# Patient Record
Sex: MALE | Race: WHITE | Employment: OTHER | URBAN - METROPOLITAN AREA
[De-identification: names, ages, dates, MRNs, and addresses within clinical notes are randomized per-mention and may not be internally consistent; named-entity substitution may affect disease eponyms.]

---

## 2024-04-12 ENCOUNTER — OFFICE VISIT (OUTPATIENT)
Dept: NEUROLOGY | Facility: CLINIC | Age: 79
End: 2024-04-12
Payer: MEDICARE

## 2024-04-12 VITALS
SYSTOLIC BLOOD PRESSURE: 129 MMHG | OXYGEN SATURATION: 97 % | HEART RATE: 73 BPM | TEMPERATURE: 97.3 F | DIASTOLIC BLOOD PRESSURE: 70 MMHG

## 2024-04-12 DIAGNOSIS — G31.84 AMNESTIC MCI (MILD COGNITIVE IMPAIRMENT WITH MEMORY LOSS): Primary | ICD-10-CM

## 2024-04-12 LAB
Lab: NORMAL
PERFORMING LABORATORY: NORMAL
SPECIMEN STATUS: NORMAL
TEST NAME: NORMAL

## 2024-04-12 PROCEDURE — 81401 MOPATH PROCEDURE LEVEL 2: CPT | Mod: ORL | Performed by: SPECIALIST

## 2024-04-12 RX ORDER — SIMVASTATIN 20 MG
1 TABLET ORAL AT BEDTIME
COMMUNITY
Start: 2023-05-24 | End: 2024-08-01

## 2024-04-12 RX ORDER — TAMSULOSIN HYDROCHLORIDE 0.4 MG/1
0.4 CAPSULE ORAL EVERY EVENING
COMMUNITY
Start: 2023-06-15 | End: 2024-07-19

## 2024-04-12 RX ORDER — DONEPEZIL HYDROCHLORIDE 10 MG/1
10 TABLET, FILM COATED ORAL AT BEDTIME
COMMUNITY
Start: 2024-02-19 | End: 2024-07-19

## 2024-04-12 RX ORDER — FLUTICASONE PROPIONATE 50 MCG
2 SPRAY, SUSPENSION (ML) NASAL EVERY EVENING
COMMUNITY

## 2024-04-12 NOTE — LETTER
4/12/2024       RE: Brian English  [address]  [phone]  [facility] [address]       Dear Colleague,    Thank you for referring your patient, Brian English, to the  [facility] at [facility]. Please see a copy of my visit note below.    [facility] New Visit Note    Chief Complaint: memory problems    History of Present Illness:  Mr. Tameka Urias) is a 78 year old right-handed male presenting for evaluation of memory problems. He is accompanied to today's visit by his wife Camille who assists in providing the history. 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 However, he was encouraged by his family to move care back to [place] area due to concerns of long distance driving.     He reported short-term memory difficulties for 2 years or so and more notable in past 6 months, mostly recalling conversations occurred like 5 minutes ago, or trouble finding his car in parking lot. Denies any word finding difficulties or navigation with direction. Some executive function impairment such as issues with tax filing. In the past 3 months, his wife Camille reported increased nervousness when driving along with him, described as he becoming more dependent upon his wife to follow driving instructions. He self described as a very cautious  with no accident. Denies any visual or auditory hallucination. No changes in personality, or delusions/paranoia symptoms.    His sleep has been poor; with ~ 3 hours maximum of sleep at night then he wakes up with trouble getting back to sleep. Denies any vivid dreams or REM sleep related movements.    He had one fall 3-4 months ago when he tripped over dog leash but no reported issues with balance, or coordination with limb movements.    He was started on aricept at 10mg daily for 1.5 years with no major noticeable changes while taking it.    He  remains very active in community services and various program such as involvement in [employer] education program (usually conferences in May).    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  Managing his own medications without any issues but needs reminders for his appointments (cell phone and wife's help).    He has been seeing a neurologist Dr. Rodriguez at [facility] and last visit was in 1/29/24. Prior work up included the FDG-PET brain, MRI brain and neuropsychological testing done in 2022.    ROS:  General: no weight loss or fever  Cardiac: no chest pain or palpitations  Pulmonary: no SOB or cough  GI: no abdominal pain, nausea, vomiting, or constipation  : no urinary urgency, pain or incontinence  Musculoskeletal: no joint pain or stiffness  Skin: no rashes or easy bruising  Neurological: as above  Pain Evaluation: no reported pain today.      PMHx:  BPH  Hyperlipidemia    No stroke or CAD    Past Surgical History:   Procedure Laterality Date    CHOLECYSTECTOMY      HAND SURGERY      Left hand April 2014, Right hand May 9th for dupuytrens contractions     Current Outpatient Medications   Medication Sig Dispense Refill    Calcium Carb-Cholecalciferol 600-20 MG-MCG CHEW Take 1 tablet by mouth daily      donepezil (ARICEPT) 10 MG tablet Take 10 mg by mouth at bedtime      fluticasone (FLONASE) 50 MCG/ACT nasal spray Spray 2 sprays into both nostrils every evening      FOLIC ACID PO Take 1 mg by mouth daily      guaiFENesin (MUCINEX) 600 MG 12 hr tablet Take 1,200 mg by mouth 2 times daily      Pyridoxine HCl (VITAMIN B-6 PO) Take 100 mg by mouth daily      simvastatin (ZOCOR) 20 MG tablet Take 1 tablet by mouth at bedtime      tamsulosin (FLOMAX) 0.4 MG capsule Take 0.4 mg by mouth every evening      albuterol (ALBUTEROL) 108 (90 BASE) MCG/ACT inhaler Inhale 2 puffs into the lungs every 4 hours as needed for shortness of breath / dyspnea (Patient not taking: Reported on  4/12/2024) 1 Inhaler 0     No current facility-administered medications for this visit.      No Known Allergies    FHx:  Maternal aunt and uncle with dementia (at age of 70s); mother with CAD    Social History     Socioeconomic History    Marital status:      Spouse name: Not on file    Number of children: Not on file    Years of education: Not on file    Highest education level: Not on file   Occupational History    Not on file   Tobacco Use    Smoking status: Former    Smokeless tobacco: Not on file   Substance and Sexual Activity    Alcohol use: Yes     Alcohol/week: 5.8 standard drinks of alcohol     Types: 7 Glasses of wine per week    Drug use: Not on file    Sexual activity: Never   Other Topics Concern    Not on file   Social History Narrative    Not on file     Social Determinants of Health     Financial Resource Strain: Not on file   Food Insecurity: Not on file   Transportation Needs: Not on file   Physical Activity: Not on file   Stress: Not on file   Social Connections: Not on file   Interpersonal Safety: Not on file   Housing Stability: Not on file       General Physical examination:  There were no vitals taken for this visit.   /70 (BP Location: Right arm, Patient Position: Sitting, Cuff Size: Adult Regular)   Pulse 73   Temp 97.3  F (36.3  C) (Temporal)   SpO2 97%      General: Mr. English is well appearing and is appropriately groomed and dressed.    Neurological examination:    Mental status: Mr. English  is awake, alert, and appropriate, with fluent speech, no word finding difficulties and no difficulty in answering questions. He is well oriented to time, place and person. Attention and concentration are intact. His fund of knowledge is fair. No apraxia is noted.  Cranial nerves:  Visual fields are full to confrontation with no visual extinction. Extraocular movements are intact. Smooth pursuit is intact. Saccades are normal in initiation, velocity and amplitude both vertically and  horizontally.  Facial strength is full bilaterally.  Sternocledomastoid and trapezius muscle strength is full bilaterally.  Motor examination: Bulk is normal throughout. Axial and upper and lower extremity tone is normal. No pronator drift is noted. Strength is 5/5 and symmetric throughout. There is no tremor or other involuntary movement. No bradykinesia or cogwheel rigidity.  Sensation: LT intact b/l throughout.  Coordination: Finger-nose-finger is normal with no dysmetria bilaterally.  Rapid finger tapping, opening and closing of fist and pronation-supination are not slowed. Positive Romberg's sign.  Gait: He has an upright and steady stance with normal stride length and arm swing. Tandem walking is slightly unsteady. Negative Retropulsion.    MoCA 26/30 (16+ years of education)  5/5 visuospatial/Executive  3/3 naming  5/6 attention (-1 for backward digit span)  3/3 language  2/2 abstract  2/5 delayed recall (+2 for semantic cues; +1 for MC; MIS 11/15)  6/6 orientation      Neuropsychological evaluation:  N/A      Labs:  7/22/22  Ref Range & Units 1 yr ago   TSH, Sensitive  0.3 - 4.2 mIU/L 2.1     Ref Range & Units 1 yr ago   Vitamin B12 Assay, S  180 - 914 ng/L 224     Ref Range & Units 1 yr ago   Folate, S  >=4.0 mcg/L >20.0     Imaging:  Results for orders placed or performed during the hospital encounter of 08/24/14   Chest XR,  PA & LAT    Narrative    XR CHEST 2 VW   8/24/2014 11:06 AM     HISTORY: Shortness of breath.    COMPARISON: None.      Impression    IMPRESSION: No acute cardiopulmonary disease.    ANOOP COULTER MD   Neck soft tissue XR    Narrative    NECK SOFT TISSUE   8/24/2014 11:06 AM     HISTORY: Evaluate epiglottitis.    COMPARISON: None.      Impression    IMPRESSION: Epiglottis appears normal on lateral view of the neck.  Airway is widely patent. No fractures identified.    ANOOP COULTER MD      MRI brain 10/11/2022  MR Brain Dementia without IV Contrast  Order: [id number]  Impression    1.   Unchanged generalized parenchymal volume loss without lobar predilection. Quantitative analysis suggests slight interval hippocampal volume loss, with decrease in the age-adjusted normative percentile from 19 to 8.  2.  No acute intracranial abnormality.    PET CT brain 10/12/22  FINDINGS:      Screen captures of relevant findings are provided in QREADS. These are typically filed as the last images in the electronic jacket for the examination.     Cortex-ID analysis indicates widespread patchy diminished hypometabolism with relative sparing of the temporal lobes, anterior aspects of the frontal lobes, and primary visual cortices. This appears to be predominantly due to misregistration artifact due to cerebral atrophy. There is no distinct pattern to suggest Alzheimer, Lewy body, or frontotemporal disease.     FDG uptake by the right caudate head is diminished compared to the left caudate head     Impression:  Mr. English is a 78 year old right-handed male who presents with memory problems for 2 years, mostly short-term memory recalls. MOCA today 26/30 with 2/5 delayed recall (MIS 11/15). The rest of neuro-exam unremarkable except +Romberg and some unsteadiness with tandem. No cogwheel rigidity or bradykinesia noted. 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 Etiologies could be Alzheimer's disease possible.     Recommendations:  1. OK to proceed with neuropsychological test or PET-amyloid studies at [place].  2. Continue aricept at 10mg daily.  3. Discuss in length about the options of anti-amyloid antibody therapies (eligibility, benefits, risks and treatment plans). Will  mail some information pamphlet to him. Will need to have amyloid PET or CSF biomarker studies to demonstrate his eligibility for this line of treatment.  4. ApoE genotype can be obtained to determine his risks/benefits with anti-amyloid antibody therapies if indicated.    Follow-up: Return in about 3  months (request to have a video visit along with his daughters for a family call.    I spent >90 minutes total today for this visit, which includes face-to-face with the patient, reviewing the chart (neuroimages, lab reports, clinical notes, medications), ordering diagnostic test, counseling, and documentation.         Again, thank you for allowing me to participate in the care of your patient.      Sincerely,    Jud Montoya MD

## 2024-04-18 LAB — MISCELLANEOUS TEST 1 (ARUP): ABNORMAL

## 2024-04-23 DIAGNOSIS — G31.84 AMNESTIC MCI (MILD COGNITIVE IMPAIRMENT WITH MEMORY LOSS): Primary | ICD-10-CM

## 2024-05-22 ENCOUNTER — ANCILLARY PROCEDURE (OUTPATIENT)
Dept: PET IMAGING | Facility: CLINIC | Age: 79
End: 2024-05-22
Attending: SPECIALIST
Payer: MEDICARE

## 2024-05-22 DIAGNOSIS — G31.84 AMNESTIC MCI (MILD COGNITIVE IMPAIRMENT WITH MEMORY LOSS): ICD-10-CM

## 2024-06-06 NOTE — PROGRESS NOTES
Neurology Memory Clinic New Visit Note    Chief Complaint: memory problems    History of Present Illness:  Mr. English is a 78 year old right-handed male presenting for evaluation of memory problems for 2 years, mostly short-term memory recalls. He was last seen in clinic in 4/12/2024 when his MOCA  was 26/30 with 2/5 delayed recall (MIS 11/15). The rest of neuro-exam unremarkable except +Romberg and some unsteadiness with tandem. No cogwheel rigidity or bradykinesia noted. He is here today for follow up and accompanied by his wife Camille and one daughter who assisted in providing the history.    Mr. English and the family reported that some decline as described as more noticeable forgetfulness, trouble with navigating familiar places, or needs to be reminded for upcoming appointments. Patient also noted that he needs to be mindful about walking in less familiar areas. Some struggles with phone texts and increased frustration with computer usage.    Denies any visual or auditory hallucination.    Sleep remains interrupted with trouble with getting into sleep. However, he feels less anxious about not getting into sleep. Currently he gets average 3-4 hours per night.    ROS:  General: no weight loss or fever  Cardiac: no chest pain or palpitations  Pulmonary: no SOB or cough  GI: no abdominal pain, nausea, vomiting, or constipation  : no urinary urgency, pain or incontinence  Musculoskeletal: no joint pain or stiffness  Skin: no rashes or easy bruising  Neurological: as above  Pain Evaluation: No reported pain today.    PMHx:  BPH  Hyperlipidemia     No stroke or CAD      Past Surgical History:   Procedure Laterality Date    CHOLECYSTECTOMY      HAND SURGERY      Left hand April 2014, Right hand May 9th for dupuytrens contractions       Current Outpatient Medications   Medication Sig Dispense Refill    Calcium Carb-Cholecalciferol 600-20 MG-MCG CHEW Take 1 tablet by mouth daily      donepezil (ARICEPT) 10 MG tablet  Take 10 mg by mouth at bedtime      fluticasone (FLONASE) 50 MCG/ACT nasal spray Spray 2 sprays into both nostrils every evening      FOLIC ACID PO Take 1 mg by mouth daily      Pyridoxine HCl (VITAMIN B-6 PO) Take 100 mg by mouth daily      simvastatin (ZOCOR) 20 MG tablet Take 1 tablet by mouth at bedtime      tamsulosin (FLOMAX) 0.4 MG capsule Take 0.4 mg by mouth every evening          Allergies   Allergen Reactions    Bee Venom Swelling     Swelling of extremity - No anaphylaxis    Dust Mites Other (See Comments)    Iodinated Contrast Media Other (See Comments)     Xray or Contrast Dye Iodine - Unknown       FHx:  Maternal aunt and uncle with dementia (at age of 70s); mother with CAD    Lab data:  4/12/2024                       APOE Alzheimer Disease Risk, Genotype                                 e3/e4 A                         Indication for testing: Determine APOE genotype for the purpose of Alzheimer disease risk assessment.     Heterozygous APOE e3/e4: One copy of the APOE e4 allele was detected, which is associated with an increased risk for   Alzheimer disease (AD). Although this genotype supports a clinical diagnosis of AD in symptomatic individuals, the   diagnosis is primarily based on clinical evaluation and APOE genotype alone is not sufficient to diagnose or   exclude AD.     Imaging:  Results for orders placed or performed in visit on 05/22/24   PET Brain Amyloid    Narrative    ;hgEXAM: PET BRAIN AMYLOID  5/22/2024 1:52 PM     Amyvid (Florbetapir F18) PET of the brain    HISTORY: Amnestic MCI (mild cognitive impairment with memory loss)       COMPARISON: None    TECHNIQUE: Dose:  11.23 mCi, 50 minutes uptake, Matrix size: 256, Type  of acquisition: 3D, Reconstruction method: Iterative reconstruction  using TOF, Attenuation correction: CT.  Regional cortical tracer uptake (RCTU) was measured for 4 parts of the  brain. From these RCTU scores, brain amyloid plaque load (BAPL) score  was  calculated.    Findings: RCTU:   Temporal lobes: 3  Frontal lobes: 3  Precuneus and cingulate region: 3  Parietal lobes: 3    BAPL score: 3    CT: Mild cerebellar volume loss. Mild-to-moderate diffuse cerebral  parenchymal volume loss. Hypoattenuation in the right anterior basal  ganglia and left anterior limb of the internal capsular region which  could be seen with chronic ischemic changes. No extra-axial  collection. Inflammatory mucosal thickening of ethmoid air cells,  maxillary sinuses. Skull base and calvarium are intact.      Impression    Impression:   Positive scan with pronounced amyloid deposition.    Incidentally identified inflammatory sinus mucosal of the maxillary  sinuses and ethmoid air cells.    -------------------  Amyvid is a radioactive diagnostic agent indicated for Positron  Emission Tomography (PET) imaging of the brain to estimate ?-amyloid  neuritic plaque density in adult patients with cognitive impairment  who are being evaluated for Alzheimer?s Disease (AD) and other causes  of cognitive decline. A negative Amyvid scan indicates sparse to no  neuritic plaques and is inconsistent with a neuropathological  diagnosis of AD at the time of image acquisition; a negative scan  result reduces the likelihood that a patient?s cognitive impairment is  due to AD. A positive Amyvid scan indicates moderate to frequent  amyloid neuritic plaques; neuropathological examination has shown this  amount of amyloid neuritic plaque is present in patients with AD, but  may also be present in patients with other types of neurologic  conditions as well as older people with normal cognition. Amyvid is an  adjunct to other diagnostic evaluations.    RCTU:  1: No tracer uptake by the gray matter that is greater than the white  matter  2. Moderate tracer uptake. Smaller areas of GM uptake equal to or  greater than WM  3. Pronounced tracer uptake. A large confluent area of GM uptake equal  to or greater than WM  uptake    BAPL:   Negative scan  1. Scan without amyloid deposition (RCTU '1' in each 4 regions)  Positive scan  2. Scan with moderate amyloid deposition (RCTU '2' in any or all  regions, no RCTU of '3')  3. Scan with pronounced amyloid deposition (RCTU '3' in at least 1  region).    ANTONIO BROWN MD         SYSTEM ID:  [id number]      Impression:  Mr. English is a 78 year old right-handed male who presents with memory problems mostly short-term memory issues for 2+ years. Since last visit, some progression per family report with increased forgetfulness and frustration with tasks. PET scan showed pronounced amyloid deposit and ApoE genotype testing showed E3/4 genotype. 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 A lengthy discussion was carried out with patient and family members about his eligibility for anti-amyloid antibody therapies, potential benefits and risks and upcoming infusion versus subcutaneous options in the horizon and pending [place] implementation of IV infusion pipeline for this line of therapies. Encourage to explore options at [place] if their anti-amyloid antibody therapy can be delivered sooner. Will keep them posted on any progression from [employer] [facility] side. Before starting treatment, will need to have a baseline MRI brain study to rule out microhemorrhages. Also discussed about selective serotonin reuptake inhibitors for mood symptoms. Patient would like to pursue behavior/psychotherapy before considering drug treatments.    Follow-up: Return in about 3-6 months depending on lecanemab treatment availability.    I spent >90 minutes total today for this visit, which includes face-to-face with the patient, reviewing the chart (neuro images, lab reports, clinical notes, medications), counseling, and documentation.

## 2024-06-07 ENCOUNTER — OFFICE VISIT (OUTPATIENT)
Dept: NEUROLOGY | Facility: CLINIC | Age: 79
End: 2024-06-07
Payer: MEDICARE

## 2024-06-07 VITALS
HEART RATE: 50 BPM | HEIGHT: 74 IN | TEMPERATURE: 98.1 F | WEIGHT: 182.2 LBS | DIASTOLIC BLOOD PRESSURE: 67 MMHG | SYSTOLIC BLOOD PRESSURE: 105 MMHG | BODY MASS INDEX: 23.38 KG/M2 | OXYGEN SATURATION: 100 %

## 2024-06-07 DIAGNOSIS — G31.84 AMNESTIC MCI (MILD COGNITIVE IMPAIRMENT WITH MEMORY LOSS): Primary | ICD-10-CM

## 2024-06-07 NOTE — LETTER
6/7/2024       RE: Brian English  [address]  [phone]  [facility] [address]     Dear Colleague,    Thank you for referring your patient, Brian English, to the  [facility] at [facility]. Please see a copy of my visit note below.    Neurology Memory Clinic New Visit Note    Chief Complaint: memory problems    History of Present Illness:  Mr. English is a 78 year old right-handed male presenting for evaluation of memory problems for 2 years, mostly short-term memory recalls. He was last seen in clinic in 4/12/2024 when his MOCA  was 26/30 with 2/5 delayed recall (MIS 11/15). The rest of neuro-exam unremarkable except +Romberg and some unsteadiness with tandem. No cogwheel rigidity or bradykinesia noted. He is here today for follow up and accompanied by his wife Camille and one daughter who assisted in providing the history.    Mr. English and the family reported that some decline as described as more noticeable forgetfulness, trouble with navigating familiar places, or needs to be reminded for upcoming appointments. Patient also noted that he needs to be mindful about walking in less familiar areas. Some struggles with phone texts and increased frustration with computer usage.    Denies any visual or auditory hallucination.    Sleep remains interrupted with trouble with getting into sleep. However, he feels less anxious about not getting into sleep. Currently he gets average 3-4 hours per night.    ROS:  General: no weight loss or fever  Cardiac: no chest pain or palpitations  Pulmonary: no SOB or cough  GI: no abdominal pain, nausea, vomiting, or constipation  : no urinary urgency, pain or incontinence  Musculoskeletal: no joint pain or stiffness  Skin: no rashes or easy bruising  Neurological: as above  Pain Evaluation: No reported pain today.    PMHx:  BPH  Hyperlipidemia     No stroke or CAD      Past Surgical History:    Procedure Laterality Date    CHOLECYSTECTOMY      HAND SURGERY      Left hand April 2014, Right hand May 9th for dupuytrens contractions       Current Outpatient Medications   Medication Sig Dispense Refill    Calcium Carb-Cholecalciferol 600-20 MG-MCG CHEW Take 1 tablet by mouth daily      donepezil (ARICEPT) 10 MG tablet Take 10 mg by mouth at bedtime      fluticasone (FLONASE) 50 MCG/ACT nasal spray Spray 2 sprays into both nostrils every evening      FOLIC ACID PO Take 1 mg by mouth daily      Pyridoxine HCl (VITAMIN B-6 PO) Take 100 mg by mouth daily      simvastatin (ZOCOR) 20 MG tablet Take 1 tablet by mouth at bedtime      tamsulosin (FLOMAX) 0.4 MG capsule Take 0.4 mg by mouth every evening          Allergies   Allergen Reactions    Bee Venom Swelling     Swelling of extremity - No anaphylaxis    Dust Mites Other (See Comments)    Iodinated Contrast Media Other (See Comments)     Xray or Contrast Dye Iodine - Unknown       FHx:  Maternal aunt and uncle with dementia (at age of 70s); mother with CAD    Lab data:  4/12/2024                       APOE Alzheimer Disease Risk, Genotype                                 e3/e4 A                         Indication for testing: Determine APOE genotype for the purpose of Alzheimer disease risk assessment.     Heterozygous APOE e3/e4: One copy of the APOE e4 allele was detected, which is associated with an increased risk for   Alzheimer disease (AD). Although this genotype supports a clinical diagnosis of AD in symptomatic individuals, the   diagnosis is primarily based on clinical evaluation and APOE genotype alone is not sufficient to diagnose or   exclude AD.     Imaging:  Results for orders placed or performed in visit on 05/22/24   PET Brain Amyloid    Narrative    ;hgEXAM: PET BRAIN AMYLOID  5/22/2024 1:52 PM     Amyvid (Florbetapir F18) PET of the brain    HISTORY: Amnestic MCI (mild cognitive impairment with memory loss)       COMPARISON: None    TECHNIQUE:  Dose:  11.23 mCi, 50 minutes uptake, Matrix size: 256, Type  of acquisition: 3D, Reconstruction method: Iterative reconstruction  using TOF, Attenuation correction: CT.  Regional cortical tracer uptake (RCTU) was measured for 4 parts of the  brain. From these RCTU scores, brain amyloid plaque load (BAPL) score  was calculated.    Findings: RCTU:   Temporal lobes: 3  Frontal lobes: 3  Precuneus and cingulate region: 3  Parietal lobes: 3    BAPL score: 3    CT: Mild cerebellar volume loss. Mild-to-moderate diffuse cerebral  parenchymal volume loss. Hypoattenuation in the right anterior basal  ganglia and left anterior limb of the internal capsular region which  could be seen with chronic ischemic changes. No extra-axial  collection. Inflammatory mucosal thickening of ethmoid air cells,  maxillary sinuses. Skull base and calvarium are intact.      Impression    Impression:   Positive scan with pronounced amyloid deposition.    Incidentally identified inflammatory sinus mucosal of the maxillary  sinuses and ethmoid air cells.    -------------------  Amyvid is a radioactive diagnostic agent indicated for Positron  Emission Tomography (PET) imaging of the brain to estimate ?-amyloid  neuritic plaque density in adult patients with cognitive impairment  who are being evaluated for Alzheimer?s Disease (AD) and other causes  of cognitive decline. A negative Amyvid scan indicates sparse to no  neuritic plaques and is inconsistent with a neuropathological  diagnosis of AD at the time of image acquisition; a negative scan  result reduces the likelihood that a patient?s cognitive impairment is  due to AD. A positive Amyvid scan indicates moderate to frequent  amyloid neuritic plaques; neuropathological examination has shown this  amount of amyloid neuritic plaque is present in patients with AD, but  may also be present in patients with other types of neurologic  conditions as well as older people with normal cognition. Amyvid is  an  adjunct to other diagnostic evaluations.    RCTU:  1: No tracer uptake by the gray matter that is greater than the white  matter  2. Moderate tracer uptake. Smaller areas of GM uptake equal to or  greater than WM  3. Pronounced tracer uptake. A large confluent area of GM uptake equal  to or greater than WM uptake    BAPL:   Negative scan  1. Scan without amyloid deposition (RCTU '1' in each 4 regions)  Positive scan  2. Scan with moderate amyloid deposition (RCTU '2' in any or all  regions, no RCTU of '3')  3. Scan with pronounced amyloid deposition (RCTU '3' in at least 1  region).    ANTONIO BROWN MD         SYSTEM ID:  [id number]      Impression:  Mr. English is a 78 year old right-handed male who presents with memory problems mostly short-term memory issues for 2+ years. Since last visit, some progression per family report with increased forgetfulness and frustration with tasks. PET scan showed pronounced amyloid deposit and ApoE genotype testing showed E3/4 genotype. 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 A lengthy discussion was carried out with patient and family members about his eligibility for anti-amyloid antibody therapies, potential benefits and risks and upcoming infusion versus subcutaneous options in the horizon and pending [place] implementation of IV infusion pipeline for this line of therapies. Encourage to explore options at [facility] if their anti-amyloid antibody therapy can be delivered sooner. Will keep them posted on any progression from [employer] [place] side. Before starting treatment, will need to have a baseline MRI brain study to rule out microhemorrhages. Also discussed about selective serotonin reuptake inhibitors for mood symptoms. Patient would like to pursue behavior/psychotherapy before considering drug treatments.    Follow-up: Return in about 3-6 months depending on [facility]em[facility]  treatment availability.    I spent >90 minutes total today for this visit, which includes face-to-face with the patient, reviewing the chart (neuro images, lab reports, clinical notes, medications), counseling, and documentation.         Again, thank you for allowing me to participate in the care of your patient.      Sincerely,    Jud Montoya MD

## 2024-06-15 ENCOUNTER — HEALTH MAINTENANCE LETTER (OUTPATIENT)
Age: 79
End: 2024-06-15

## 2024-06-26 RX ORDER — TAMSULOSIN HYDROCHLORIDE 0.4 MG/1
0.4 CAPSULE ORAL EVERY EVENING
OUTPATIENT
Start: 2024-06-26

## 2024-06-26 RX ORDER — DONEPEZIL HYDROCHLORIDE 10 MG/1
10 TABLET, FILM COATED ORAL AT BEDTIME
OUTPATIENT
Start: 2024-06-26

## 2024-07-19 DIAGNOSIS — Z76.0 ENCOUNTER FOR MEDICATION REFILL: Primary | ICD-10-CM

## 2024-07-19 RX ORDER — TAMSULOSIN HYDROCHLORIDE 0.4 MG/1
0.4 CAPSULE ORAL EVERY EVENING
Qty: 30 CAPSULE | Refills: 0 | Status: SHIPPED | OUTPATIENT
Start: 2024-07-19 | End: 2024-08-01

## 2024-07-19 RX ORDER — DONEPEZIL HYDROCHLORIDE 10 MG/1
10 TABLET, FILM COATED ORAL AT BEDTIME
Qty: 30 TABLET | Refills: 0 | Status: SHIPPED | OUTPATIENT
Start: 2024-07-19 | End: 2024-08-01 | Stop reason: DRUGHIGH

## 2024-08-01 ENCOUNTER — OFFICE VISIT (OUTPATIENT)
Dept: FAMILY MEDICINE | Facility: CLINIC | Age: 79
End: 2024-08-01
Payer: COMMERCIAL

## 2024-08-01 VITALS
TEMPERATURE: 97.8 F | DIASTOLIC BLOOD PRESSURE: 68 MMHG | HEART RATE: 70 BPM | SYSTOLIC BLOOD PRESSURE: 106 MMHG | OXYGEN SATURATION: 97 % | BODY MASS INDEX: 24.25 KG/M2 | WEIGHT: 183 LBS | RESPIRATION RATE: 16 BRPM | HEIGHT: 73 IN

## 2024-08-01 DIAGNOSIS — N40.0 BENIGN PROSTATIC HYPERPLASIA WITHOUT LOWER URINARY TRACT SYMPTOMS: ICD-10-CM

## 2024-08-01 DIAGNOSIS — F02.80 ALZHEIMER'S DISEASE (H): Primary | ICD-10-CM

## 2024-08-01 DIAGNOSIS — E78.5 HYPERLIPIDEMIA LDL GOAL <100: ICD-10-CM

## 2024-08-01 DIAGNOSIS — M20.42 HAMMER TOES OF BOTH FEET: ICD-10-CM

## 2024-08-01 DIAGNOSIS — F51.01 PRIMARY INSOMNIA: ICD-10-CM

## 2024-08-01 DIAGNOSIS — J30.2 SEASONAL ALLERGIC RHINITIS, UNSPECIFIED TRIGGER: ICD-10-CM

## 2024-08-01 DIAGNOSIS — H10.13 ALLERGIC CONJUNCTIVITIS OF BOTH EYES: ICD-10-CM

## 2024-08-01 DIAGNOSIS — G30.9 ALZHEIMER'S DISEASE (H): Primary | ICD-10-CM

## 2024-08-01 DIAGNOSIS — M20.41 HAMMER TOES OF BOTH FEET: ICD-10-CM

## 2024-08-01 DIAGNOSIS — L60.8 DEFORMITY OF TOENAIL: ICD-10-CM

## 2024-08-01 PROCEDURE — 99204 OFFICE O/P NEW MOD 45 MIN: CPT | Performed by: INTERNAL MEDICINE

## 2024-08-01 RX ORDER — SIMVASTATIN 20 MG
20 TABLET ORAL AT BEDTIME
Qty: 90 TABLET | Refills: 3 | Status: SHIPPED | OUTPATIENT
Start: 2024-08-01

## 2024-08-01 RX ORDER — TAMSULOSIN HYDROCHLORIDE 0.4 MG/1
0.4 CAPSULE ORAL EVERY EVENING
Qty: 90 CAPSULE | Refills: 3 | Status: SHIPPED | OUTPATIENT
Start: 2024-08-01

## 2024-08-01 RX ORDER — DONEPEZIL HYDROCHLORIDE 23 MG/1
23 TABLET, FILM COATED ORAL AT BEDTIME
COMMUNITY
Start: 2024-07-19 | End: 2024-09-05 | Stop reason: ALTCHOICE

## 2024-08-01 RX ORDER — OLOPATADINE HYDROCHLORIDE 1 MG/ML
1 SOLUTION/ DROPS OPHTHALMIC 2 TIMES DAILY
Qty: 5 ML | Refills: 3 | Status: SHIPPED | OUTPATIENT
Start: 2024-08-01

## 2024-08-01 NOTE — PROGRESS NOTES
Efren Torres is a 78 year old, presenting for the following health issues:  Establish Care    History of Present Illness       Reason for visit:  New patient    He eats 2-3 servings of fruits and vegetables daily.He exercises with enough effort to increase his heart rate 10 to 19 minutes per day.  He exercises with enough effort to increase his heart rate 5 days per week. He is missing 1 dose(s) of medications per week.         Current Medications:     Current Outpatient Medications   Medication Sig Dispense Refill    Calcium Carb-Cholecalciferol 600-20 MG-MCG CHEW Take 1 tablet by mouth daily      donepezil (ARICEPT) 23 MG tablet Take 23 mg by mouth at bedtime      fluticasone (FLONASE) 50 MCG/ACT nasal spray Spray 2 sprays into both nostrils every evening      FOLIC ACID PO Take 1 mg by mouth daily      Pyridoxine HCl (VITAMIN B-6 PO) Take 100 mg by mouth daily      simvastatin (ZOCOR) 20 MG tablet Take 1 tablet by mouth at bedtime      tamsulosin (FLOMAX) 0.4 MG capsule Take 1 capsule (0.4 mg) by mouth every evening Take 0.4 mg by mouth every evening 30 capsule 0         Allergies:      Allergies   Allergen Reactions    Bee Venom Swelling     Swelling of extremity - No anaphylaxis    Dust Mites Other (See Comments)    Iodinated Contrast Media Other (See Comments)     Xray or Contrast Dye Iodine - Unknown            Past Medical History:   No past medical history on file.      Past Surgical History:     Past Surgical History:   Procedure Laterality Date    CHOLECYSTECTOMY      HAND SURGERY      Left hand April 2014, Right hand May 9th for dupuytrens contractions         Family Medical History:   No family history on file.      Social History:     Social History     Socioeconomic History    Marital status:      Spouse name: Not on file    Number of children: Not on file    Years of education: Not on file    Highest education level: Not on file   Occupational History    Not on file   Tobacco Use     Smoking status: Former    Smokeless tobacco: Not on file   Substance and Sexual Activity    Alcohol use: Yes     Alcohol/week: 5.8 standard drinks of alcohol     Types: 7 Glasses of wine per week    Drug use: Not on file    Sexual activity: Never   Other Topics Concern    Not on file   Social History Narrative    Not on file     Social Determinants of Health     Financial Resource Strain: Low Risk  (8/1/2024)    Financial Resource Strain     Within the past 12 months, have you or your family members you live with been unable to get utilities (heat, electricity) when it was really needed?: No   Food Insecurity: Low Risk  (8/1/2024)    Food Insecurity     Within the past 12 months, did you worry that your food would run out before you got money to buy more?: No     Within the past 12 months, did the food you bought just not last and you didn t have money to get more?: No   Transportation Needs: Low Risk  (8/1/2024)    Transportation Needs     Within the past 12 months, has lack of transportation kept you from medical appointments, getting your medicines, non-medical meetings or appointments, work, or from getting things that you need?: No   Physical Activity: Insufficiently Active (1/19/2024)    Received from [facility]    Exercise Vital Sign     Days of Exercise per Week: 2 days     Minutes of Exercise per Session: 40 min   Stress: Stress Concern Present (5/27/2022)    Received from [facility]    [employer] - Occupational Stress Questionnaire     Feeling of Stress : To some extent   Social Connections: Socially Integrated (5/27/2022)    Received from [facility]    Social Connection and Isolation Panel [NHANES]     Frequency of Communication with Friends and Family: More than three times a week     Frequency of Social Gatherings with Friends and Family: Twice a week     Attends [employer] Services: More than 4 times per year     Active Member of Clubs or Organizations: Yes     Attends  "Club or Organization Meetings: More than 4 times per year     Marital Status:    Interpersonal Safety: Not At Risk (1/19/2024)    Received from [facility]    Humiliation, Afraid, Rape, and Kick questionnaire     Fear of Current or Ex-Partner: No     Emotionally Abused: No     Physically Abused: No     Sexually Abused: No   Housing Stability: High Risk (8/1/2024)    Housing Stability     Do you have housing? : No     Are you worried about losing your housing?: No           Review of System:     Constitutional: Negative for fever or chills  Skin: Negative for rashes  Ears/Nose/Throat: Negative for nasal congestion, sore throat  Respiratory: No shortness of breath, dyspnea on exertion, cough, or hemoptysis  Cardiovascular: Negative for chest pain  Gastrointestinal: Negative for nausea, vomiting  Genitourinary: Negative for dysuria, hematuria  Musculoskeletal: Negative for myalgias  Neurologic: Negative for headaches  Psychiatric: Negative for depression, anxiety  Hematologic/Lymphatic/Immunologic: Negative  Endocrine: Negative  Behavioral: Negative for tobacco use       Physical Exam:   /68 (BP Location: Right arm, Patient Position: Sitting, Cuff Size: Adult Regular)   Pulse 70   Temp 97.8  F (36.6  C)   Resp 16   Ht 1.852 m (6' 0.91\")   Wt 83 kg (183 lb)   SpO2 97%   BMI 24.20 kg/m      GENERAL: alert and no distress  EYES: eyes grossly normal to inspection, and conjunctivae and sclerae normal  HENT: Normocephalic atraumatic. Nose and mouth without ulcers or lesions  NECK: supple  RESP: lungs clear to auscultation   CV: regular rate and rhythm, normal S1 S2  LYMPH: no peripheral edema   ABDOMEN: nondistended  MS: no gross musculoskeletal defects noted  SKIN: no suspicious lesions or rashes  NEURO: mild memory loss symptoms noted  PSYCH: mentation appears normal, affect normal        Diagnostic Test Results:     Diagnostic Test Results:  Labs reviewed in Epic    ASSESSMENT/PLAN:       Brian was " seen today for establish care.    Diagnoses and all orders for this visit:    Alzheimer's disease (H)  -     Memory Clinic Referral; Future    Hyperlipidemia LDL goal <100  -     simvastatin (ZOCOR) 20 MG tablet; Take 1 tablet (20 mg) by mouth at bedtime Take 1 tablet by mouth at bedtime    Seasonal allergic rhinitis, unspecified trigger  -     Adult Allergy/Asthma  Referral; Future    Benign prostatic hyperplasia without lower urinary tract symptoms  - stable, medication refilled today for    tamsulosin (FLOMAX) 0.4 MG capsule; Take 1 capsule (0.4 mg) by mouth every evening Take 0.4 mg by mouth every evening    Allergic conjunctivitis of both eyes  -     olopatadine (PATANOL) 0.1 % ophthalmic solution; Place 1 drop into both eyes 2 times daily  -     Adult Allergy/Asthma  Referral; Future    Hammer toes of both feet  -     Orthopedic  Referral; Future    Deformity of toenail  -     Orthopedic  Referral; Future    Primary insomnia  -     Adult Sleep Eval & Management  Referral; Future            Follow Up Plan:     Patient is instructed to return to Internal Medicine clinic for follow-up visit in 1 month.        Rachel Arora MD  Internal Medicine  [facility]    Signed Electronically by: Rachel Arora MD

## 2024-08-21 ENCOUNTER — TELEPHONE (OUTPATIENT)
Dept: GERIATRICS | Facility: CLINIC | Age: 79
End: 2024-08-21

## 2024-08-21 NOTE — TELEPHONE ENCOUNTER
Dr. Lima,     Patient has an upcoming appt with you on 09/05/24. Form was mail back to us and complete.     Just want to inform you that I have that questionnaire at my desk.     Thank you.

## 2024-09-05 ENCOUNTER — OFFICE VISIT (OUTPATIENT)
Dept: FAMILY MEDICINE | Facility: CLINIC | Age: 79
End: 2024-09-05
Payer: COMMERCIAL

## 2024-09-05 VITALS
HEIGHT: 73 IN | DIASTOLIC BLOOD PRESSURE: 68 MMHG | RESPIRATION RATE: 16 BRPM | WEIGHT: 183 LBS | HEART RATE: 79 BPM | SYSTOLIC BLOOD PRESSURE: 103 MMHG | BODY MASS INDEX: 24.25 KG/M2 | OXYGEN SATURATION: 95 %

## 2024-09-05 DIAGNOSIS — G30.1 MILD LATE ONSET ALZHEIMER'S DEMENTIA WITHOUT BEHAVIORAL DISTURBANCE, PSYCHOTIC DISTURBANCE, MOOD DISTURBANCE, OR ANXIETY (H): Primary | ICD-10-CM

## 2024-09-05 DIAGNOSIS — F02.A0 MILD LATE ONSET ALZHEIMER'S DEMENTIA WITHOUT BEHAVIORAL DISTURBANCE, PSYCHOTIC DISTURBANCE, MOOD DISTURBANCE, OR ANXIETY (H): Primary | ICD-10-CM

## 2024-09-05 PROCEDURE — 99417 PROLNG OP E/M EACH 15 MIN: CPT | Performed by: INTERNAL MEDICINE

## 2024-09-05 PROCEDURE — G2211 COMPLEX E/M VISIT ADD ON: HCPCS | Performed by: INTERNAL MEDICINE

## 2024-09-05 PROCEDURE — 99215 OFFICE O/P EST HI 40 MIN: CPT | Performed by: INTERNAL MEDICINE

## 2024-09-05 RX ORDER — DONEPEZIL HYDROCHLORIDE 10 MG/1
10 TABLET, FILM COATED ORAL EVERY MORNING
COMMUNITY

## 2024-09-05 ASSESSMENT — PAIN SCALES - GENERAL: PAINLEVEL: NO PAIN (0)

## 2024-09-05 NOTE — Clinical Note
FYI they plan to schedule follow up with you as had great repoire though no longer planning anti-amyloid therapies as in the interim [facility] noted microhemorrhages on MRI (I've requested records). Thanks!

## 2024-09-05 NOTE — PROGRESS NOTES
"MEMORY EVALUATION CLINIC - INITIAL EVALUATION    HPI: Mr. Brian English is an 78yoM here today for initial consultation in our clinic as referred by PCP. He is accompanied by his wife Camille and one of his daughters Renetta. He feels \"pretty good\" overall but has insight into cognitive changes and earlier this year formal diagnosis of early stages of Alzheimer's disease. They note gradually progressive cognitive changes for about 2 years in areas of : short term and long term memory loss, word/name finding difficulty and visual processing. 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 More recently decided to consolidate care in [facility] closer to home. Saw [facility] neurologist Dr. Jud Montoya and [facility] 26/30 with +PET scan for amyloid deposition and heterozygous APOe. Was awaiting potential anti-amyloid therapies within the system, planning future repeat MRI and met in the meantime with [facility] Dr. Washington. We are requesting those records but apparently MRI of the brain in their clinic identified microhemorrhages so he was told he was not a candidate for anti-amyloid therapies and they are accepting of this. They did have a good repoire though with Dr. Montoya and would be interested in continuing to follow along with her and her neurology expertise. Would like me to be a part of his care too for now.    Of note, Dr. Washington had him increase Donepezil from 10 mg to 23 mg but that dose was not tolerated at all; felt dizzy and nauseated on the 23 mg dose which really wiped him out and improved when back down to 10 mg dose. Some mild loose stools possibly from Donepezil though but tolerable and wishes to stay on 10 mg dose. They ask questions about potential other therapies such as Namenda.    Overall, family feels he has been taking this all in stride pretty well and they are thankful for this and compliment him. Camille shares " "a loss that she has noticed the past 6 months in that he doesn't have the sense of humor he once did nor able to have as indepth quality conversations. She knows he can't help it but just she notices this and shares this today. She has a wonderful support network in her life as a resource. He's not feeling the need to add an anti-depressant at this time but they've considered this occasionally and Dr. Montoya had mentioned possible Lexapro which I'd agree with too if desired down-the-line. He does enjoy reading. Some insomnia at times the past several years with his cognitive changes; will switch Donepezil from PM to AM to see if this helps.     Lastly, he misses driving. Retired at family's suggestion last month after PCP mentioned potential concern given his diagnosis and family too also starting to notice changes. A year ago Renetta rode with him on the highway and he was slower than surrounding traffic. Also has been able to track him on \"360\" and sees he is making lots of U turns and seemingly getting lost at times which was not like how he used to drive. She has not felt comfortable riding along with him since. Camille noted when she rode along she used to have to really direct him where he was going which became more concerning for her in time. They are considering a behind-the-wheel assessment if that will help him have an independent review.      Social Hx (Employment/Schooling): Originally from [place]. Graduated from [employer]. Worked as a  for a few years and then [employer] medical administration for 32 years. In second marriage. Has 2 adult daughters ([place] and [place] Enjoys reading, card games, walking, courses/seminars.  PMH: Reviewed. Most notable for: Hyperlipidemia  Is there a h/o delirium, CVA/TIA, TBI, substances, parkinsonism?: Microhemorrhages per report in recent MRI per [facility]  Family Hx: Yes maternal grandmother    ADLs: Independent  Driving: Concerns as " "noted above by family ; now retired from driving the past month  Finances: Camille has now been managing taxes/bills  Shopping/housekeeping/meal prep: Mostly per Camille  Medications: Camille starting to help the past few months  Home situation: Independent apartment with Camille; though on wait list for shelter  Support: Family  Behaviors/Hallucinations/Delusions: None reported      OBJECTIVE:  /68 (BP Location: Left arm, Patient Position: Sitting, Cuff Size: Adult Regular)   Pulse 79   Resp 16   Ht 1.852 m (6' 0.91\")   Wt 83 kg (183 lb)   SpO2 95%   BMI 24.20 kg/m    Alert, pleasant, nicely groomed, healthy appearing  Normal speech, no obvious tremor  Independent gait  Kind disposition  Does report frustration re: recent custodial from driving      INVESTIGATIONS: Normal TSH, low normal B12 and normal folate per [facility] in   Heterozygous APOe  May 2024 amyloid PET: \"Positive scan with pronounced amyloid deposition\"  Summer 2024 MRI: Requesting official records but per their report microhemorrhages were detected    PRIOR TESTIN: Previous neuropsych testing per [facility] consistent with MCI per notes  2024: [facility] / per Dr. 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 Per prior work up, amyloid presence concerning for Alzheimer's subtype. Would be a potential candidate for anti-amyloid therapies save for recently reported finding of microhemorrages on MRI at [facility]. They are accepting of not being on the anti-amyloid therapies. Will continue on 10 mg Donepezil but change timing of administration from PM to AM to see if less insomnia. For now, he doesn't feel he needs " selective serotonin reuptake inhibitor such as Lexapro but they will monitor mood over time. Several topics discussed as noted above. Driving [employer] was a big topic of concern; see patient instructions below. Has new ID badge for safety given to him from his daughter. JAYE for [facility] signed; also Consent to Communicate for wife Camille and his daughters.  Could consider Namenda but I haven't seen many folks with glowing response to it though admittedly overall well tolerated though some folks develop increased anxiety. They will discuss with Dr. Montoya as well whom they have a good repoire with. Future follow up over time; I surely can follow with serial MoCA exams too as desired.    Patient Instructions   Take Donepezil 10 mg daily in AM and see if less struggles with insomnia      We're requesting [facility] records      Schedule follow up with Dr. Montoya      Consider driving evaluation if you'd like but otherwise sounds safest plan is to remain retired from driving  [employer] professional driving assessment: [email or web address]  To schedule an appointment at any of our locations please call [phone]       Follow up with me 3-6 months or as needed based on your follow up with Dr. Montoya  Call to schedule your follow up appointment: # [phone]  [facility]  [address]  [address]  [place] [address]      The longitudinal plan of care for the diagnosis(es)/condition(s) as documented were addressed during this visit. Due to the added complexity in care, I will continue to support Brian in the subsequent management and with ongoing continuity of care.        90 minutes spent on the date of the encounter doing chart review, history and exam, counseling, documentation and further activities as noted above      Meka Lima DO  [facility]

## 2024-09-05 NOTE — PATIENT INSTRUCTIONS
Take Donepezil 10 mg daily in AM and see if less struggles with insomnia      We're requesting [facility] records      Schedule follow up with Dr. Montoya      Consider driving evaluation if you'd like but otherwise sounds safest plan is to remain retired from driving  Ginette Randall professional driving assessment: [email or web address]  To schedule an appointment at any of our locations please call [phone]       Follow up with me 3-6 months or as needed based on your follow up with Dr. Montoya  Call to schedule your follow up appointment: # [phone]  [facility]  [address]  [address]  [place] [address]

## 2024-10-11 ENCOUNTER — DOCUMENTATION ONLY (OUTPATIENT)
Dept: OTHER | Facility: CLINIC | Age: 79
End: 2024-10-11
Payer: MEDICARE

## 2024-10-14 ENCOUNTER — DOCUMENTATION ONLY (OUTPATIENT)
Dept: OTHER | Facility: CLINIC | Age: 79
End: 2024-10-14
Payer: MEDICARE

## 2024-10-31 ENCOUNTER — THERAPY VISIT (OUTPATIENT)
Dept: PHYSICAL THERAPY | Facility: CLINIC | Age: 79
End: 2024-10-31
Payer: MEDICARE

## 2024-10-31 ENCOUNTER — TRANSCRIBE ORDERS (OUTPATIENT)
Dept: OTHER | Age: 79
End: 2024-10-31

## 2024-10-31 DIAGNOSIS — M25.552 HIP PAIN, LEFT: Primary | ICD-10-CM

## 2024-10-31 DIAGNOSIS — M25.552 LEFT HIP PAIN: ICD-10-CM

## 2024-10-31 DIAGNOSIS — M76.02 GLUTEAL TENDONITIS OF LEFT BUTTOCK: Primary | ICD-10-CM

## 2024-10-31 PROCEDURE — 97110 THERAPEUTIC EXERCISES: CPT | Mod: GP

## 2024-10-31 PROCEDURE — 97161 PT EVAL LOW COMPLEX 20 MIN: CPT | Mod: GP

## 2024-10-31 PROCEDURE — 97530 THERAPEUTIC ACTIVITIES: CPT | Mod: GP

## 2024-10-31 ASSESSMENT — ACTIVITIES OF DAILY LIVING (ADL)
HOS_ADL_SCORE(%): 85
WALKING_15_MINUTES_OR_GREATER: NO DIFFICULTY AT ALL
WALKING_UP_STEEP_HILLS: SLIGHT DIFFICULTY
ABILITY_TO_PARTICIPATE_IN_YOUR_DESIRED_SPORT_AS_LONG_AS_YOU_WOULD_LIKE: SLIGHT DIFFICULTY
SPORTS_SCORE(%): 0
HEAVY_WORK: MODERATE DIFFICULTY
GETTING INTO AND OUT OF AN AVERAGE CAR: NO DIFFICULTY AT ALL
ROLLING OVER IN BED: NO DIFFICULTY AT ALL
GETTING_INTO_AND_OUT_OF_A_BATHTUB: NO DIFFICULTY AT ALL
STEPPING_UP_AND_DOWN_CURBS: NO DIFFICULTY AT ALL
HOW_WOULD_YOU_RATE_YOUR_CURRENT_LEVEL_OF_FUNCTION?: NEARLY NORMAL
WALKING_APPROXIMATELY_10_MINUTES: NO DIFFICULTY AT ALL
DEEP_SQUATTING: SLIGHT DIFFICULTY
LOW_IMPACT_ACTIVITIES_LIKE_FAST_WALKING: SLIGHT DIFFICULTY
JUMPING: EXTREME DIFFICULTY
HOW_WOULD_YOU_RATE_YOUR_CURRENT_LEVEL_OF_FUNCTION_DURING_YOUR_SPORTS_RELATED_ACTIVITIES_FROM_0_TO_100_WITH_100_BEING_YOUR_LEVEL_OF_FUNCTION_PRIOR_TO_YOUR_HIP_PROBLEM_AND_0_BEING_THE_INABILITY_TO_PERFORM_ANY_OF_YOUR_USUAL_DAILY_ACTIVITIES?: 85
RECREATIONAL_ACTIVITIES: MODERATE DIFFICULTY
ABILITY_TO_PERFORM_ACTIVITY_WITH_YOUR_NORMAL_TECHNIQUE: NO DIFFICULTY AT ALL
WALKING_DOWN_STEEP_HILLS: SLIGHT DIFFICULTY
TWISTING/PIVOTING_ON_INVOLVED_LEG: NO DIFFICULTY AT ALL
ADL_HIGHEST_POTENTIAL_SCORE: 60
HEAVY_WORK: MODERATE DIFFICULTY
LANDING: EXTREME DIFFICULTY
SITTING_FOR_15_MINUTES: NO DIFFICULTY AT ALL
GETTING_INTO_AND_OUT_OF_AN_AVERAGE_CAR: NO DIFFICULTY AT ALL
PUTTING ON SOCKS AND SHOES: NO DIFFICULTY AT ALL
DEEP SQUATTING: SLIGHT DIFFICULTY
HOS_ADL_HIGHEST_POTENTIAL_SCORE: 60
LIGHT_TO_MODERATE_WORK: SLIGHT DIFFICULTY
GETTING_INTO_AND_OUT_OF_A_BATHTUB: NO DIFFICULTY AT ALL
STANDING_FOR_15_MINUTES: SLIGHT DIFFICULTY
STARTING_AND_STOPPING_QUICKLY: SLIGHT DIFFICULTY
SWINGING_OBJECTS_LIKE_A_GOLF_CLUB: SLIGHT DIFFICULTY
CUTTING/LATERAL_MOVEMENTS: EXTREME DIFFICULTY
RUNNING_ONE_MILE: EXTREME DIFFICULTY
PLEASE_INDICATE_YOR_PRIMARY_REASON_FOR_REFERRAL_TO_THERAPY:: HIP
ADL_TOTAL_ITEM_SCORE: INCOMPLETE
ADL_SCORE(%): INCOMPLETE
SPORTS_COUNT: 9
RECREATIONAL ACTIVITIES: MODERATE DIFFICULTY
STANDING FOR 15 MINUTES: SLIGHT DIFFICULTY
HOS_ADL_ITEM_SCORE_TOTAL: 51
WALKING_INITIALLY: NO DIFFICULTY AT ALL
ADL_COUNT: 15
SPORTS_TOTAL_ITEM_SCORE: 0
ROLLING_OVER_IN_BED: NO DIFFICULTY AT ALL
SITTING FOR 15 MINUTES: NO DIFFICULTY AT ALL
WALKING_FOR_APPROXIMATELY_10_MINUTES: NO DIFFICULTY AT ALL
WALKING_INITIALLY: NO DIFFICULTY AT ALL
STEPPING UP AND DOWN CURBS: NO DIFFICULTY AT ALL
WALKING_15_MINUTES_OR_GREATER: NO DIFFICULTY AT ALL
WALKING_UP_STEEP_HILLS: SLIGHT DIFFICULTY
WALKING_DOWN_STEEP_HILLS: SLIGHT DIFFICULTY
PUTTING_ON_SOCKS_AND_SHOES: NO DIFFICULTY AT ALL
SPORTS_HIGHEST_POTENTIAL_SCORE: 36
LIGHT_TO_MODERATE_WORK: SLIGHT DIFFICULTY
TWISTING/PIVOTING ON INVOLVED LEG: NO DIFFICULTY AT ALL

## 2024-10-31 NOTE — PROGRESS NOTES
PHYSICAL THERAPY EVALUATION  Type of Visit: Evaluation              Subjective         Presenting condition or subjective complaint: (Patient-Rptd) left hip pain  Patient reports to outpatient physical therapy with L posterior glute pain that started a month ago. He has the most pain with walking and lying on the L side. He doesn't notice as many symptoms sitting and no pain lying on back. Has not done much walking recently, but has been biking. He describes it as achy and not sharp. Has neuropathy but no back pain or referring pain. Has been recently diagnosed with short term memory loss. Within the past 6 months he has felt a little more clumsy than usual.     Date of onset: 09/30/24    Relevant medical history: (Patient-Rptd) Bladder or bowel problems   No past medical history on file.    Dates & types of surgery: (Patient-Rptd) appendix removal, cornea replacement  Past Surgical History:   Procedure Laterality Date    CHOLECYSTECTOMY      HAND SURGERY      Left hand April 2014, Right hand May 9th for dupuytrens contractions     Prior diagnostic imaging/testing results:       Prior therapy history for the same diagnosis, illness or injury:        Prior Level of Function  Transfers: Independent  Ambulation: Independent  ADL: Independent  IADL:     Living Environment  Social support:     Type of home: (Patient-Rptd) Apartment/condo; Multi-level   Stairs to enter the home: (Patient-Rptd) No       Ramp: (Patient-Rptd) No   Stairs inside the home: (Patient-Rptd) No       Help at home: (Patient-Rptd) Self Cares (home health aide/personal care attendant, family, etc)  Equipment owned: (Patient-Rptd) Grab bars; Raised toilet seat     Employment: (Patient-Rptd) No    Hobbies/Interests: (Patient-Rptd) walking & reading    Patient goals for therapy: (Patient-Rptd) experience less or no pain in L hip    Pain assessment: Pain present     Objective   HIP EVALUATION  PAIN: Pain Level at Rest: 2/10  Pain Level with Use:  4/10  Pain Location: hip  Pain Quality: Aching  POSTURE:  R hip drop  GAIT:   Weightbearing Status:  normal  Assistive Device(s): None  Gait Deviations:  limited hip ext bilaterally, trendelenburg L>R   ROM:  hip flexion WFL bilaterally, ER = limited L>R side, IR slight limited bilaterally  STRENGTH:  hip flexion: 5 bilaterally, hip abd: 3+ L side, 4+ R side, hip ext: 4 L side, 4+ R side  LE FLEXIBILITY:  heel to glute to 95/100 deg bilaterally  SPECIAL TESTS:  not assessed  FUNCTIONAL TESTS: Double Leg Squat: Anterior knee translation, Knee valgus, Hip internal rotation, and Improper use of glutes/hips  PALPATION:  no palpable tenderness in proximal attachment hamstring L side, piriformis L side glute med/max L side  JOINT MOBILITY: limited L>R hip    Assessment & Plan   CLINICAL IMPRESSIONS  Medical Diagnosis: L hip pain/glute pain    Treatment Diagnosis:     Impression/Assessment: Patient is a 79 year old male with L hip/glute complaints.  The following significant findings have been identified: Pain, Decreased ROM/flexibility, Decreased joint mobility, Decreased strength, Impaired gait, Impaired muscle performance, Decreased activity tolerance, and Impaired posture. These impairments interfere with their ability to perform self care tasks, recreational activities, household chores, household mobility, and community mobility as compared to previous level of function.     Clinical Decision Making (Complexity):  Clinical Presentation: Stable/Uncomplicated  Clinical Presentation Rationale: based on medical and personal factors listed in PT evaluation  Clinical Decision Making (Complexity): Low complexity    PLAN OF CARE  Treatment Interventions:  Modalities: Cryotherapy, Dry Needling, E-stim, Hot Pack  Interventions: Gait Training, Manual Therapy, Neuromuscular Re-education, Therapeutic Activity, Therapeutic Exercise, Self-Care/Home Management    Long Term Goals            Frequency of Treatment: 1x/wk progressing  to 1 x every other week  Duration of Treatment: 2-3 month    Recommended Referrals to Other Professionals: n/a at present  Education Assessment:   Learner/Method: Patient;Significant Other    Risks and benefits of evaluation/treatment have been explained.   Patient/Family/caregiver agrees with Plan of Care.     Evaluation Time:     PT Eval, Low Complexity Minutes (72577): 15     Signing Clinician: Kandice Gomez, PT        [facility]                                                                                   OUTPATIENT PHYSICAL THERAPY      PLAN OF TREATMENT FOR OUTPATIENT REHABILITATION   Patient's Last Name, First Name, Brian Fisher YOB: 1945   Provider's Name   [facility]   Medical Record No.  [id number]     Onset Date: 09/30/24  Start of Care Date: 10/31/24     Medical Diagnosis:  L hip pain/glute pain      PT Treatment Diagnosis:    Plan of Treatment  Frequency/Duration: 1x/wk progressing to 1 x every other week/ 2-3 month    Certification date from 10/31/24 to 01/23/25         See note for plan of treatment details and functional goals     Kandice Gomez, PT                         I CERTIFY THE NEED FOR THESE SERVICES FURNISHED UNDER        THIS PLAN OF TREATMENT AND WHILE UNDER MY CARE .             Physician Signature               Date    X_____________________________________________________                  Referring Provider:  Eileen Cannon    Initial Assessment  See Epic Evaluation- Start of Care Date: 10/31/24

## 2024-11-06 ENCOUNTER — TELEPHONE (OUTPATIENT)
Dept: FAMILY MEDICINE | Facility: CLINIC | Age: 79
End: 2024-11-06

## 2024-11-06 NOTE — TELEPHONE ENCOUNTER
Forms/Letter Request    Type of form/letter:  [employer] eligibility application    Is Release of Information needed?: unknown    Was an JAYE obtained?  No    Do we have the form/letter: yes    Who is the form from? Pt's wife Camille Ibarra    Where did/will the form come from? Patient or family brought in       When is form/letter needed by: ASAP    How would you like the form/letter returned: Mail IN PROVIDED ENVELOPE    Patient Notified form requests are processed in 5-7 business days:Yes    Could we send this information to you in [facility] or would you prefer to receive a phone call?:   Patient would prefer a phone call   Okay to leave a detailed message?: Yes at Other phone number: [phone]

## 2024-11-07 ENCOUNTER — THERAPY VISIT (OUTPATIENT)
Dept: PHYSICAL THERAPY | Facility: CLINIC | Age: 79
End: 2024-11-07
Payer: MEDICARE

## 2024-11-07 DIAGNOSIS — M25.552 HIP PAIN, LEFT: Primary | ICD-10-CM

## 2024-11-07 PROCEDURE — 97530 THERAPEUTIC ACTIVITIES: CPT | Mod: GP

## 2024-11-07 PROCEDURE — 97110 THERAPEUTIC EXERCISES: CPT | Mod: GP

## 2024-11-07 NOTE — PROGRESS NOTES
10/31/24 0500   Appointment Info   Signing clinician's name / credentials Kandice Gomez, PT, DPT, OCS   Total/Authorized Visits 10   Visits Used 1   Medical Diagnosis L hip pain/glute pain   Quick Adds Certification   Progress Note/Certification   Start of Care Date 10/31/24   Onset of illness/injury or Date of Surgery 09/30/24   Therapy Frequency 1x/wk progressing to 1 x every other week   Predicted Duration 2-3 month   Certification date from 10/31/24   Certification date to 01/23/25   GOALS   PT Goals 2   PT Goal 1   Goal Identifier sitting   Goal Description patient will be able to sit 20 min without pain   Rationale to maximize safety and independence with performance of ADLs and functional tasks;to maximize safety and independence within the home;to maximize safety and independence with self cares   Target Date 01/23/24   PT Goal 2   Goal Identifier ambulation   Goal Description patient will be able to walk for 20 min without pain   Rationale to maximize safety and independence with performance of ADLs and functional tasks;to maximize safety and independence within the home;to maximize safety and independence with self cares   Target Date 01/23/24   Subjective Report   Subjective Report see eval   Treatment Interventions (PT)   Interventions Therapeutic Procedure/Exercise;Therapeutic Activity;Neuromuscular Re-education   Therapeutic Procedure/Exercise   PTRx Ther Proc 1 Pretzel Stretch   PTRx Ther Proc 1 - Details x 60 sec on L side in clinic with VC and TC for set-up   PTRx Ther Proc 2 Bridging   PTRx Ther Proc 2 - Details x 10 in clinic with VC to squeeze glutes prior to lifting hips; trial with SL in clinic but unable to do at present   PTRx Ther Proc 3 Sidelying Hip Abduction   PTRx Ther Proc 3 - Details x 10 on L side in clinic with TC for set-up   Therapeutic Procedures: strength, endurance, ROM, flexibility minutes (96688) 10   Skilled Intervention see above   Patient Response/Progress patient  tolerated well   Therapeutic Activity   PTRx Ther Act 1 Education Sheet General   PTRx Ther Act 1 - Details patient ed for sleeping position   Therapeutic Activities: dynamic activities to improve functional performance minutes (60966) 10   Ther Act 1 patient ed   Ther Act 1 - Details patient ed on likely cause of symptoms and POC; patient ed on not sitting on wallet in L hip pocket   Skilled Intervention see above   Patient Response/Progress patient reports understanding   Neuromuscular Re-education   PTRx Neuro Re-ed 1 Proprioception At Counter Weight Shift   PTRx Neuro Re-ed 1 - Details x 4 min in clinic with TC and VC for set-up   Neuromuscular re-ed of mvmt, balance, coord, kinesthetic sense, posture, proprioception minutes (68152) 5   Skilled Intervention see above   Patient Response/Progress patient tolerated well with repetition and reports understanding   Eval/Assessments   PT Eval, Low Complexity Minutes (46782) 15   Education   Learner/Method Patient;Significant Other   Plan   Home program see PTRX   Updates to plan of care eval   Plan for next session recheck gait and L hip ROM, manual for L hip (mobs into ER), progress glute strength   Total Session Time   Timed Code Treatment Minutes 25   Total Treatment Time (sum of timed and untimed services) 40         [facility]                                                                                   OUTPATIENT PHYSICAL THERAPY    PLAN OF TREATMENT FOR OUTPATIENT REHABILITATION   Patient's Last Name, First Name, Brian Fisher YOB: 1945   Provider's Name   [facility]   Medical Record No.  [id number]     Onset Date: 09/30/24  Start of Care Date: 10/31/24     Medical Diagnosis:  L hip pain/glute pain      PT Treatment Diagnosis:    Plan of Treatment  Frequency/Duration: 1x/wk progressing to 1 x every other week/ 2-3 month    Certification date from 10/31/24 to 01/23/25          See note for plan of treatment details and functional goals     Kandice Gomez, PT                         I CERTIFY THE NEED FOR THESE SERVICES FURNISHED UNDER        THIS PLAN OF TREATMENT AND WHILE UNDER MY CARE     (Physician attestation of this document indicates review and certification of the therapy plan).              Referring Provider:  Eileen Cannon    Initial Assessment  See Epic Evaluation- Start of Care Date: 10/31/24            PLAN  Continue therapy per current plan of care.    Beginning/End Dates of Progress Note Reporting Period:    10/31/2024    Referring Provider:  Eileen Cannon

## 2024-11-08 ENCOUNTER — VIRTUAL VISIT (OUTPATIENT)
Dept: FAMILY MEDICINE | Facility: CLINIC | Age: 79
End: 2024-11-08
Payer: COMMERCIAL

## 2024-11-08 DIAGNOSIS — G30.1 MILD LATE ONSET ALZHEIMER'S DEMENTIA WITHOUT BEHAVIORAL DISTURBANCE, PSYCHOTIC DISTURBANCE, MOOD DISTURBANCE, OR ANXIETY (H): Primary | ICD-10-CM

## 2024-11-08 DIAGNOSIS — N40.0 BENIGN PROSTATIC HYPERPLASIA WITHOUT LOWER URINARY TRACT SYMPTOMS: ICD-10-CM

## 2024-11-08 DIAGNOSIS — J30.2 SEASONAL ALLERGIC RHINITIS, UNSPECIFIED TRIGGER: ICD-10-CM

## 2024-11-08 DIAGNOSIS — E78.5 HYPERLIPIDEMIA LDL GOAL <100: ICD-10-CM

## 2024-11-08 DIAGNOSIS — F02.A0 MILD LATE ONSET ALZHEIMER'S DEMENTIA WITHOUT BEHAVIORAL DISTURBANCE, PSYCHOTIC DISTURBANCE, MOOD DISTURBANCE, OR ANXIETY (H): Primary | ICD-10-CM

## 2024-11-08 PROCEDURE — 99443 PR PHYSICIAN TELEPHONE EVALUATION 21-30 MIN: CPT | Mod: 93 | Performed by: INTERNAL MEDICINE

## 2024-11-08 NOTE — PROGRESS NOTES
Brian is a 79 year old who is being evaluated via a billable telephone visit.    What phone number would you like to be contacted at? [phone]  How would you like to obtain your AVS? Evelina  Originating Location (pt. Location): Home    Distant Location (provider location):  On-site    Subjective   Brian is a 79 year old, presenting for the following health issues:  Forms      11/8/2024    10:35 AM   Additional Questions   Roomed by Zuleika   Accompanied by Camille, wife     History of Present Illness       Reason for visit:  Paperwork for [employer]    He eats 4 or more servings of fruits and vegetables daily.He consumes 0 sweetened beverage(s) daily.He exercises with enough effort to increase his heart rate 30 to 60 minutes per day.  He exercises with enough effort to increase his heart rate 5 days per week.   He is taking medications regularly.         Current Medications:     Current Outpatient Medications   Medication Sig Dispense Refill    Calcium Carb-Cholecalciferol 600-20 MG-MCG CHEW Take 1 tablet by mouth daily      donepezil (ARICEPT) 10 MG tablet Take 10 mg by mouth every morning.      fluticasone (FLONASE) 50 MCG/ACT nasal spray Spray 2 sprays into both nostrils every evening      FOLIC ACID PO Take 1 mg by mouth daily      olopatadine (PATANOL) 0.1 % ophthalmic solution Place 1 drop into both eyes 2 times daily 5 mL 3    Pyridoxine HCl (VITAMIN B-6 PO) Take 100 mg by mouth daily      simvastatin (ZOCOR) 20 MG tablet Take 1 tablet (20 mg) by mouth at bedtime Take 1 tablet by mouth at bedtime 90 tablet 3    tamsulosin (FLOMAX) 0.4 MG capsule Take 1 capsule (0.4 mg) by mouth every evening Take 0.4 mg by mouth every evening 90 capsule 3         Allergies:      Allergies   Allergen Reactions    Bee Venom Swelling     Swelling of extremity - No anaphylaxis    Dust Mites Other (See Comments)    Iodinated Contrast Media Other (See Comments)     Xray or Contrast Dye Iodine - Unknown            Past  Medical History:     Past Medical History:   Diagnosis Date    Alzheimer dementia (H)          Past Surgical History:     Past Surgical History:   Procedure Laterality Date    CHOLECYSTECTOMY      HAND SURGERY      Left hand April 2014, Right hand May 9th for dupuytrens contractions         Family Medical History:   No family history on file.      Social History:     Social History     Socioeconomic History    Marital status:      Spouse name: Not on file    Number of children: Not on file    Years of education: Not on file    Highest education level: Not on file   Occupational History    Not on file   Tobacco Use    Smoking status: Former    Smokeless tobacco: Not on file   Vaping Use    Vaping status: Never Used   Substance and Sexual Activity    Alcohol use: Yes     Alcohol/week: 5.8 standard drinks of alcohol     Types: 7 Glasses of wine per week    Drug use: Never    Sexual activity: Yes     Partners: Female   Other Topics Concern    Not on file   Social History Narrative    Not on file     Social Drivers of Health     Financial Resource Strain: Low Risk  (8/1/2024)    Financial Resource Strain     Within the past 12 months, have you or your family members you live with been unable to get utilities (heat, electricity) when it was really needed?: No   Food Insecurity: Low Risk  (8/1/2024)    Food Insecurity     Within the past 12 months, did you worry that your food would run out before you got money to buy more?: No     Within the past 12 months, did the food you bought just not last and you didn t have money to get more?: No   Transportation Needs: Low Risk  (8/1/2024)    Transportation Needs     Within the past 12 months, has lack of transportation kept you from medical appointments, getting your medicines, non-medical meetings or appointments, work, or from getting things that you need?: No   Physical Activity: Insufficiently Active (1/19/2024)    Received from [facility]    Exercise Vital Sign      Days of Exercise per Week: 2 days     Minutes of Exercise per Session: 40 min   Stress: Stress Concern Present (5/27/2022)    Received from [facility]    [employer] [place] [employer] - Occupational Stress Questionnaire     Feeling of Stress : To some extent   Social Connections: Socially Integrated (5/27/2022)    Received from [facility]    Social Connection and Isolation Panel [NHANES]     Frequency of Communication with Friends and Family: More than three times a week     Frequency of Social Gatherings with Friends and Family: Twice a week     Attends [employer] Services: More than 4 times per year     Active Member of Clubs or Organizations: Yes     Attends Club or Organization Meetings: More than 4 times per year     Marital Status:    Interpersonal Safety: Not At Risk (1/19/2024)    Received from [facility]    Humiliation, Afraid, Rape, and Kick questionnaire     Fear of Current or Ex-Partner: No     Emotionally Abused: No     Physically Abused: No     Sexually Abused: No   Housing Stability: High Risk (8/1/2024)    Housing Stability     Do you have housing? : No     Are you worried about losing your housing?: No           Review of System:     Constitutional: Negative for fever or chills  Skin: Negative for rashes  Ears/Nose/Throat: positive for allergic rhinitis  Respiratory: No shortness of breath, dyspnea on exertion, cough, or hemoptysis  Cardiovascular: Negative for chest pain  Gastrointestinal: Negative for nausea, vomiting  Genitourinary: positive for BPH symptoms  Musculoskeletal: Negative for myalgias  Neurologic: Negative for headaches, positive for alzheimer's dementia  Psychiatric: Negative for depression, anxiety  Hematologic/Lymphatic/Immunologic: Negative  Endocrine: Negative  Behavioral: Negative for tobacco use       Physical Exam:   There were no vitals taken for this visit.    RESP: no cough present   NEURO: dementia present  PSYCH: dementia present        Diagnostic  Test Results:     Diagnostic Test Results:  Labs reviewed in Epic    ASSESSMENT/PLAN:       Brian was seen today for forms.    Diagnoses and all orders for this visit:    Mild late onset Alzheimer's dementia without behavioral disturbance, psychotic disturbance, mood disturbance, or anxiety (H)  - stable  - [employer] application paperwork signed today  - continue Aricept  - continue neurology clinic follow up    Hyperlipidemia LDL goal <100  - stable, continue current therapy    Seasonal allergic rhinitis, unspecified trigger  - stable, continue current therapy    Benign prostatic hyperplasia without lower urinary tract symptoms  - stable, continue current therapy          Follow Up Plan:     Patient is instructed to return to Internal Medicine clinic for follow-up visit in 1 month.        Rachel Arora MD  Internal Medicine  [facility]      telephone visit duration including chart review medication management: 27 minutes    Signed Electronically by: Rachel Arora MD

## 2024-11-20 ENCOUNTER — THERAPY VISIT (OUTPATIENT)
Dept: PHYSICAL THERAPY | Facility: CLINIC | Age: 79
End: 2024-11-20
Payer: MEDICARE

## 2024-11-20 DIAGNOSIS — M25.552 HIP PAIN, LEFT: Primary | ICD-10-CM

## 2024-11-21 ENCOUNTER — OFFICE VISIT (OUTPATIENT)
Dept: FAMILY MEDICINE | Facility: CLINIC | Age: 79
End: 2024-11-21
Payer: COMMERCIAL

## 2024-11-21 VITALS
SYSTOLIC BLOOD PRESSURE: 110 MMHG | DIASTOLIC BLOOD PRESSURE: 75 MMHG | TEMPERATURE: 97.6 F | RESPIRATION RATE: 16 BRPM | OXYGEN SATURATION: 97 % | HEART RATE: 87 BPM | BODY MASS INDEX: 24.12 KG/M2 | HEIGHT: 73 IN | WEIGHT: 182 LBS

## 2024-11-21 DIAGNOSIS — E78.5 HYPERLIPIDEMIA LDL GOAL <100: ICD-10-CM

## 2024-11-21 DIAGNOSIS — F51.01 PRIMARY INSOMNIA: ICD-10-CM

## 2024-11-21 DIAGNOSIS — G30.1 MILD LATE ONSET ALZHEIMER'S DEMENTIA WITHOUT BEHAVIORAL DISTURBANCE, PSYCHOTIC DISTURBANCE, MOOD DISTURBANCE, OR ANXIETY (H): Primary | ICD-10-CM

## 2024-11-21 DIAGNOSIS — F02.A0 MILD LATE ONSET ALZHEIMER'S DEMENTIA WITHOUT BEHAVIORAL DISTURBANCE, PSYCHOTIC DISTURBANCE, MOOD DISTURBANCE, OR ANXIETY (H): Primary | ICD-10-CM

## 2024-11-21 DIAGNOSIS — F43.23 ADJUSTMENT DISORDER WITH MIXED ANXIETY AND DEPRESSED MOOD: ICD-10-CM

## 2024-11-21 DIAGNOSIS — Z13.29 SCREENING FOR THYROID DISORDER: ICD-10-CM

## 2024-11-21 LAB
CHOLEST SERPL-MCNC: 164 MG/DL
FASTING STATUS PATIENT QL REPORTED: YES
HDLC SERPL-MCNC: 58 MG/DL
LDLC SERPL CALC-MCNC: 91 MG/DL
NONHDLC SERPL-MCNC: 106 MG/DL
TRIGL SERPL-MCNC: 75 MG/DL
TSH SERPL DL<=0.005 MIU/L-ACNC: 2.24 UIU/ML (ref 0.3–4.2)

## 2024-11-21 PROCEDURE — 99214 OFFICE O/P EST MOD 30 MIN: CPT | Performed by: INTERNAL MEDICINE

## 2024-11-21 PROCEDURE — G2211 COMPLEX E/M VISIT ADD ON: HCPCS | Performed by: INTERNAL MEDICINE

## 2024-11-21 PROCEDURE — 84443 ASSAY THYROID STIM HORMONE: CPT | Performed by: INTERNAL MEDICINE

## 2024-11-21 PROCEDURE — 80061 LIPID PANEL: CPT | Performed by: INTERNAL MEDICINE

## 2024-11-21 PROCEDURE — 36415 COLL VENOUS BLD VENIPUNCTURE: CPT | Performed by: INTERNAL MEDICINE

## 2024-11-21 RX ORDER — MIRTAZAPINE 15 MG/1
15 TABLET, FILM COATED ORAL AT BEDTIME
Qty: 90 TABLET | Refills: 3 | Status: SHIPPED | OUTPATIENT
Start: 2024-11-21

## 2024-11-21 NOTE — PROGRESS NOTES
"  {PROVIDER CHARTING PREFERENCE:561327}    Efren Torres is a 79 year old, presenting for the following health issues:  Recheck Medication  {(!) Visit Details have not yet been documented.  Please enter Visit Details and then use this list to pull in documentation. (Optional):754452}  History of Present Illness       Reason for visit:  Paperwork for [employer]    He eats 4 or more servings of fruits and vegetables daily.He consumes 0 sweetened beverage(s) daily.He exercises with enough effort to increase his heart rate 30 to 60 minutes per day.  He exercises with enough effort to increase his heart rate 5 days per week.   He is taking medications regularly.       {MA/LPN/RN Pre-Provider Visit Orders- hCG/UA/Strep (Optional):033961}  {SUPERLIST (Optional):190519}  {additonal problems for provider to add (Optional):614630}    {ROS Picklists (Optional):848879}      Objective    /75 (BP Location: Right arm, Patient Position: Sitting, Cuff Size: Adult Regular)   Pulse 87   Temp 97.6  F (36.4  C) (Oral)   Resp 16   Ht 1.852 m (6' 0.91\")   Wt 82.6 kg (182 lb)   SpO2 97%   BMI 24.07 kg/m    Body mass index is 24.07 kg/m .  Physical Exam   {Exam List (Optional):679689}    {Diagnostic Test Results (Optional):676406}          The longitudinal plan of care for the diagnosis(es)/condition(s) as documented were addressed during this visit. Due to the added complexity in care, I will continue to support this patient in the subsequent management and with ongoing continuity of care     Signed Electronically by: Rachel Arora MD  {Email feedback regarding this note to [email or web address]   :588859}  " History:   No family history on file.      Social History:     Social History     Socioeconomic History    Marital status:      Spouse name: Not on file    Number of children: Not on file    Years of education: Not on file    Highest education level: Not on file   Occupational History    Not on file   Tobacco Use    Smoking status: Former    Smokeless tobacco: Not on file   Vaping Use    Vaping status: Never Used   Substance and Sexual Activity    Alcohol use: Yes     Alcohol/week: 5.8 standard drinks of alcohol     Types: 7 Glasses of wine per week    Drug use: Never    Sexual activity: Yes     Partners: Female   Other Topics Concern    Not on file   Social History Narrative    Not on file     Social Drivers of Health     Financial Resource Strain: Low Risk  (8/1/2024)    Financial Resource Strain     Within the past 12 months, have you or your family members you live with been unable to get utilities (heat, electricity) when it was really needed?: No   Food Insecurity: Low Risk  (8/1/2024)    Food Insecurity     Within the past 12 months, did you worry that your food would run out before you got money to buy more?: No     Within the past 12 months, did the food you bought just not last and you didn t have money to get more?: No   Transportation Needs: Low Risk  (8/1/2024)    Transportation Needs     Within the past 12 months, has lack of transportation kept you from medical appointments, getting your medicines, non-medical meetings or appointments, work, or from getting things that you need?: No   Physical Activity: Insufficiently Active (1/19/2024)    Received from [facility]    Exercise Vital Sign     Days of Exercise per Week: 2 days     Minutes of Exercise per Session: 40 min   Stress: Stress Concern Present (5/27/2022)    Received from [facility]    [employer] [place] [employer] - Occupational Stress Questionnaire     Feeling of Stress : To some extent   Social Connections: Socially  "Integrated (5/27/2022)    Received from [facility]    Social Connection and Isolation Panel [NHANES]     Frequency of Communication with Friends and Family: More than three times a week     Frequency of Social Gatherings with Friends and Family: Twice a week     Attends [employer] Services: More than 4 times per year     Active Member of Clubs or Organizations: Yes     Attends Club or Organization Meetings: More than 4 times per year     Marital Status:    Interpersonal Safety: Low Risk  (11/21/2024)    Interpersonal Safety     Do you feel physically and emotionally safe where you currently live?: Yes     Within the past 12 months, have you been hit, slapped, kicked or otherwise physically hurt by someone?: No     Within the past 12 months, have you been humiliated or emotionally abused in other ways by your partner or ex-partner?: No   Housing Stability: High Risk (8/1/2024)    Housing Stability     Do you have housing? : No     Are you worried about losing your housing?: No           Review of System:     Constitutional: Negative for fever or chills  Skin: Negative for rashes  Ears/Nose/Throat: Negative for nasal congestion, sore throat  Respiratory: No shortness of breath, dyspnea on exertion, cough, or hemoptysis  Cardiovascular: Negative for chest pain  Gastrointestinal: Negative for nausea, vomiting  Genitourinary: Negative for dysuria, hematuria  Musculoskeletal: Negative for myalgias  Neurologic: Negative for headaches, positive for alzheimer's dementia  Psychiatric: positive for depression, anxiety, insomnia symptoms  Hematologic/Lymphatic/Immunologic: Negative  Endocrine: Negative  Behavioral: Negative for tobacco use       Physical Exam:   /75 (BP Location: Right arm, Patient Position: Sitting, Cuff Size: Adult Regular)   Pulse 87   Temp 97.6  F (36.4  C) (Oral)   Resp 16   Ht 1.852 m (6' 0.91\")   Wt 82.6 kg (182 lb)   SpO2 97%   BMI 24.07 kg/m      GENERAL: alert and no " distress  EYES: eyes grossly normal to inspection, and conjunctivae and sclerae normal  HENT: Normocephalic atraumatic. Nose and mouth without ulcers or lesions  NECK: supple  RESP: lungs clear to auscultation   CV: regular rate and rhythm, normal S1 S2  LYMPH: no peripheral edema   ABDOMEN: nondistended  MS: no gross musculoskeletal defects noted  SKIN: no suspicious lesions or rashes  NEURO: memory loss symptoms noted  PSYCH: depressed affect         Diagnostic Test Results:     Diagnostic Test Results:  Labs reviewed in Epic    ASSESSMENT/PLAN:     Brian was seen today for follow up    Diagnoses and all orders for this visit:    Mild late onset Alzheimer's dementia without behavioral disturbance, psychotic disturbance, mood disturbance, or anxiety (H)  -  worsening dementia symptoms  - continue Aricept  - follow up with memory clinic, neurology clinic  -   REVIEW OF HEALTH MAINTENANCE PROTOCOL ORDERS    Hyperlipidemia LDL goal <100  -     Lipid panel reflex to direct LDL Non-fasting; Future  -     Lipid panel reflex to direct LDL Non-fasting  -     simvastatin (ZOCOR) 20 MG tablet; Take 1 tablet (20 mg) by mouth at bedtime Take 1 tablet by mouth at bedtime    Screening for thyroid disorder  -     TSH with free T4 reflex; Future  -     TSH with free T4 reflex    Adjustment disorder with mixed anxiety and depressed mood  Primary insomnia  -   not well controlled  -  mirtazapine (REMERON) 15 MG tablet; Take 1 tablet (15 mg) by mouth at bedtime.    Seasonal allergic rhinitis, unspecified trigger  -   stable, continue current therapy           Follow Up Plan:     Patient is instructed to return to Internal Medicine clinic for follow-up visit in 1 month.        Rachel Arora MD  Internal Medicine  [facility]    The longitudinal plan of care for the diagnosis(es)/condition(s) as documented were addressed during this visit. Due to the added complexity in care, I will continue to support this patient in the  subsequent management and with ongoing continuity of care       Signed Electronically by: Rachel Arora MD

## 2024-12-03 ENCOUNTER — THERAPY VISIT (OUTPATIENT)
Dept: PHYSICAL THERAPY | Facility: CLINIC | Age: 79
End: 2024-12-03
Payer: COMMERCIAL

## 2024-12-03 DIAGNOSIS — M25.552 HIP PAIN, LEFT: Primary | ICD-10-CM

## 2024-12-03 PROCEDURE — 97530 THERAPEUTIC ACTIVITIES: CPT | Mod: GP

## 2024-12-03 PROCEDURE — 97110 THERAPEUTIC EXERCISES: CPT | Mod: GP

## 2024-12-07 ASSESSMENT — SLEEP AND FATIGUE QUESTIONNAIRES
HOW LIKELY ARE YOU TO NOD OFF OR FALL ASLEEP WHILE SITTING AND READING: SLIGHT CHANCE OF DOZING
HOW LIKELY ARE YOU TO NOD OFF OR FALL ASLEEP WHILE SITTING AND TALKING TO SOMEONE: WOULD NEVER DOZE
HOW LIKELY ARE YOU TO NOD OFF OR FALL ASLEEP WHILE SITTING QUIETLY AFTER LUNCH WITHOUT ALCOHOL: SLIGHT CHANCE OF DOZING
HOW LIKELY ARE YOU TO NOD OFF OR FALL ASLEEP WHILE LYING DOWN TO REST IN THE AFTERNOON WHEN CIRCUMSTANCES PERMIT: HIGH CHANCE OF DOZING
HOW LIKELY ARE YOU TO NOD OFF OR FALL ASLEEP WHILE WATCHING TV: MODERATE CHANCE OF DOZING
HOW LIKELY ARE YOU TO NOD OFF OR FALL ASLEEP IN A CAR, WHILE STOPPED FOR A FEW MINUTES IN TRAFFIC: WOULD NEVER DOZE
HOW LIKELY ARE YOU TO NOD OFF OR FALL ASLEEP WHILE SITTING INACTIVE IN A PUBLIC PLACE: SLIGHT CHANCE OF DOZING
HOW LIKELY ARE YOU TO NOD OFF OR FALL ASLEEP WHEN YOU ARE A PASSENGER IN A CAR FOR AN HOUR WITHOUT A BREAK: SLIGHT CHANCE OF DOZING

## 2024-12-12 ENCOUNTER — OFFICE VISIT (OUTPATIENT)
Dept: SLEEP MEDICINE | Facility: CLINIC | Age: 79
End: 2024-12-12
Payer: COMMERCIAL

## 2024-12-12 VITALS
BODY MASS INDEX: 24.39 KG/M2 | HEART RATE: 80 BPM | WEIGHT: 184 LBS | HEIGHT: 73 IN | DIASTOLIC BLOOD PRESSURE: 73 MMHG | OXYGEN SATURATION: 96 % | SYSTOLIC BLOOD PRESSURE: 115 MMHG

## 2024-12-12 DIAGNOSIS — G30.1 MILD LATE ONSET ALZHEIMER'S DEMENTIA WITHOUT BEHAVIORAL DISTURBANCE, PSYCHOTIC DISTURBANCE, MOOD DISTURBANCE, OR ANXIETY (H): ICD-10-CM

## 2024-12-12 DIAGNOSIS — F02.A0 MILD LATE ONSET ALZHEIMER'S DEMENTIA WITHOUT BEHAVIORAL DISTURBANCE, PSYCHOTIC DISTURBANCE, MOOD DISTURBANCE, OR ANXIETY (H): ICD-10-CM

## 2024-12-12 DIAGNOSIS — G47.00 INSOMNIA, UNSPECIFIED TYPE: ICD-10-CM

## 2024-12-12 DIAGNOSIS — R06.83 SNORING: Primary | ICD-10-CM

## 2024-12-12 DIAGNOSIS — F43.23 ADJUSTMENT DISORDER WITH MIXED ANXIETY AND DEPRESSED MOOD: ICD-10-CM

## 2024-12-12 DIAGNOSIS — G47.52 DREAM ENACTMENT BEHAVIOR: ICD-10-CM

## 2024-12-12 NOTE — NURSING NOTE
"Chief Complaint   Patient presents with    Sleep Problem     Trouble staying asleep       Initial /73   Pulse 80   Ht 1.854 m (6' 1\")   Wt 83.5 kg (184 lb)   SpO2 96%   BMI 24.28 kg/m   Estimated body mass index is 24.28 kg/m  as calculated from the following:    Height as of this encounter: 1.854 m (6' 1\").    Weight as of this encounter: 83.5 kg (184 lb).    Medication Reconciliation: complete  ESS 9  Neck circumference: 39 centimeters.  Meka Luu MA   "

## 2024-12-12 NOTE — PROGRESS NOTES
Outpatient Sleep Medicine Consultation:      Name: Brian English MRN# [id number]   Age: 79 year old YOB: 1945     Date of Consultation: December 12, 2024  Consultation is requested by: Rachel Arora MD  [address] [address] Rachel Arora  Primary care provider: Rachel Arora       Reason for Sleep Consult:     Brian English is sent by Rachel Arora for a sleep consultation regarding insomnia.    Patient s Reason for visit  Brian English main reason for visit: (Proxy-Rptd) Interrupted sleep & difficulty getting back to sleep  Patient states problem(s) started: (Proxy-Rptd) Years  Brian English's goals for this visit: (Proxy-Rptd) A solution to longer interupted sleep patterns         Assessment and Plan:     Summary Sleep Diagnoses:  1. Snoring (Primary)  2. Insomnia, unspecified type  3.Dream enactment behavior  Comorbid Diagnoses:  4. Mild late onset Alzheimer's dementia without behavioral disturbance, psychotic disturbance, mood disturbance, or anxiety (H)  5. Adjustment disorder with mixed anxiety and depressed mood    Patient presents to clinic today with concern of insomnia.  Does admit that sleep has improved since starting on mirtazapine for anxiety/depression/sleep.  He still wakes however 2 or 3 times a night generally to use the restroom.  If it is towards the end of his sleep may be difficult to return to sleep, in particular if he wakes at 4 AM or later which we discussed is generally not uncommon.  He does take a nap daily, sometimes up to twice a day which is likely contributing to some insomnia and early morning awakening as well.  Discussed ideally he should try to avoid any daytime sleep and consolidate all sleep to nighttime hours to help sleep continuity but if napping is inevitable should take as early in the day as possible and limit to ideally 30-40 minutes to not interfere with sleep at night and he voiced  "agreement.  During our discussion today patient voiced symptoms concerning for underlying MORA which could also be contributing to nighttime arousals, in particular loud disruptive snoring and gasping/choking arousals.  Snoring can be loud enough that it wakes Camille up despite her CPAP machine.  Gasping/choking in sleep is approximately once or twice a month, Camille has never witnessed an apneic event.  In addition to concern for sleep disordered breathing also appears he has intermittent dream enactment.  Estimates 2 episodes per year, no injuries but has hit and kicked Camille in his sleep, no dream recall but Camille feels it is obvious he is dreaming.  This has been ongoing the last few years.  Agreed to have him come in for in lab polysomnogram evaluation to evaluate REM motor tone and sleep disordered breathing.  If study is positive for MORA patient is very open to starting on CPAP and he is comfortable with me placing empiric auto CPAP orders to expedite treatment set up.  Agreed once study is finalized I will send message on [facility] and [facility] orders for CPAP if indicated with plan to see him back a couple months after starting on CPAP to discuss compliance and treatment outcomes.  Education materials provided in AVS.  - Comprehensive Sleep Study; Future         History of Present Illness:     Brian English is a 79 year old male with Alzheimers dementia, adjustment disorder, BPH, allergic rhinitis who presents to clinic today with wife Camille for evaluation of insomnia.     Past Sleep Evaluations: Reports historical PSG completed through [facility] 20+ years ago, does not recall any details about the results and no record.    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  Wakes up due to (Proxy-Rptd) Pain;Use the bathroom x1-2;Anxiety  He has trouble falling back asleep particularly if wakes 4 AM or later, may not return to sleep at all.  It usually takes (Proxy-Rptd) Varies to get back to sleep -anywhere from 15-60 minutes  Patient is usually up at (Proxy-Rptd) 6:00am  Uses alarm: (Proxy-Rptd) No    Weekends/Non-work Days/All Other Days:  Usually gets into bed at (Proxy-Rptd) 10 pm  Takes patient about (Proxy-Rptd) 15-20 minutes to fall asleep  Patient is usually up at (Proxy-Rptd) 6am  Uses alarm: (Proxy-Rptd) No    Started on mirtazapine 3 weeks ago and does report improvement in sleep/insomnia.    Sleep Need  Patient estimates he gets  (Proxy-Rptd) 6 hours sleep on average   Patient thinks he needs about (Proxy-Rptd) 8 hours sleep    Brian English prefers to sleep in this position(s): (Proxy-Rptd) Side   Patient states they do the following activities in bed: (Proxy-Rptd) Read;Use phone, computer, or tablet    Naps  Patient takes a purposeful nap (Proxy-Rptd) 7 times a week and naps are usually (Proxy-Rptd) 1-3 hours in duration  -often taken after lunch  He feels better after a nap: (Proxy-Rptd) Yes  He dozes off unintentionally (Proxy-Rptd) 7 days per week when watching TV in the evenings  Patient has had a driving accident or near-miss due to sleepiness/drowsiness: (Proxy-Rptd) No    SLEEP DISRUPTIONS:    Breathing/Snoring  Patient snores:(Proxy-Rptd) Yes  Other people complain about his snoring: (Proxy-Rptd) Yes - loud enough to wake Luiz despite her CPAP   Patient has been told he stops breathing in his sleep: No  Gasping/choking: once or twice a month  He has issues with the following: (Proxy-Rptd) Morning mouth dryness;Stuffy nose when you wake up;Getting up to urinate more than once  Denies morning headache, nocturnal GERD    Movement:  Patient gets  "pain, discomfort, with an urge to move:  (Proxy-Rptd) Yes -denies RLS syndrome symptoms but states \"I said yes because of my left buttocks I had some pain\", improved with PT   It happens when he is resting:  (Proxy-Rptd) Yes  It happens more at night:  (Proxy-Rptd) Yes  Patient has been told he kicks his legs at night:  (Proxy-Rptd) No     Behaviors in Sleep:  Brian English has experienced the following behaviors while sleeping: (Proxy-Rptd) Sleep-talking;Kicking or punching -Naranjo states \"he has socked me once or twice he was dreaming I could tell\".  Brian has no memory of dreaming or of this.  Started a few years ago and estimates 2 episodes of dream enactment each year.  Has kicked Camille a couple times.    Denies sleepwalking, sleep eating, bruxism, recurring nightmares, night terrors, sleep paralysis, hypnagogic/hypnopompic hallucinations, cataplexy    CAFFEINE AND OTHER SUBSTANCES:    Patient consumes caffeinated beverages per day:  (Proxy-Rptd) 3  Last caffeine use is usually: (Proxy-Rptd) Noon  List of any prescribed or over the counter stimulants that patient takes:  None  List of any prescribed or over the counter sleep medication patient takes:  Mirtazapine  List of previous sleep medications that patient has tried: (Proxy-Rptd) Tylenol PM  Patient drinks alcohol to help them sleep: (Proxy-Rptd) No  Patient drinks alcohol near bedtime: (Proxy-Rptd) No    Family History:  Patient has a family member been diagnosed with a sleep disorder: (Proxy-Rptd) Yes  (Proxy-Rptd) My wife has sleep apnea & uses a CPAP mashine       SCALES:    EPWORTH SLEEPINESS SCALE         12/7/2024     2:00 PM    [place] Sleepiness Scale ( PELON Carrington  [id number]<br>ESS - [place]/English - Final version - 21 Nov 07 - [facility] [place].)   Sitting and reading Slight chance of dozing   Watching TV Moderate chance of dozing   Sitting, inactive in a public place (e.g. a theatre or a meeting) Slight chance of dozing   As a " passenger in a car for an hour without a break Slight chance of dozing   Lying down to rest in the afternoon when circumstances permit High chance of dozing   Sitting and talking to someone Would never doze   Sitting quietly after a lunch without alcohol Slight chance of dozing   In a car, while stopped for a few minutes in traffic Would never doze   [place] Score (MC) 9   F[place] Score (Sleep) 9        Patient-reported         INSOMNIA SEVERITY INDEX (PATRIC)          12/7/2024     1:37 PM   Insomnia Severity Index (PATRIC)   Difficulty falling asleep 2    Difficulty staying asleep 3    Problems waking up too early 3    How SATISFIED/DISSATISFIED are you with your CURRENT sleep pattern? 3    How NOTICEABLE to others do you think your sleep problem is in terms of impairing the quality of your life? 1    How WORRIED/DISTRESSED are you about your current sleep problem? 2    To what extent do you consider your sleep problem to INTERFERE with your daily functioning (e.g. daytime fatigue, mood, ability to function at work/daily chores, concentration, memory, mood, etc.) CURRENTLY? 2    PATRIC Total Score 16        Patient-reported         Guidelines for Scoring/Interpretation:  Total score categories:  0-7 = No clinically significant insomnia   8-14 = Subthreshold insomnia   15-21 = Clinical insomnia (moderate severity)  22-28 = Clinical insomnia (severe)  Used via courtesy of www.iClinicalealth.va.gov with permission from Tom Lenz PhD., [facility]      Allergies:    Allergies   Allergen Reactions    Bee Venom Swelling     Swelling of extremity - No anaphylaxis    Dust Mites Other (See Comments)    Iodinated Contrast Media Other (See Comments)     Xray or Contrast Dye Iodine - Unknown       Medications:    Current Outpatient Medications   Medication Sig Dispense Refill    Calcium Carb-Cholecalciferol 600-20 MG-MCG CHEW Take 1 tablet by mouth daily      donepezil (ARICEPT) 10 MG tablet Take 10 mg by mouth every morning.       fluticasone (FLONASE) 50 MCG/ACT nasal spray Spray 2 sprays into both nostrils every evening      FOLIC ACID PO Take 1 mg by mouth daily      mirtazapine (REMERON) 15 MG tablet Take 1 tablet (15 mg) by mouth at bedtime. 90 tablet 3    Pyridoxine HCl (VITAMIN B-6 PO) Take 100 mg by mouth daily      simvastatin (ZOCOR) 20 MG tablet Take 1 tablet (20 mg) by mouth at bedtime Take 1 tablet by mouth at bedtime 90 tablet 3    tamsulosin (FLOMAX) 0.4 MG capsule Take 1 capsule (0.4 mg) by mouth every evening Take 0.4 mg by mouth every evening 90 capsule 3       Problem List:  Patient Active Problem List    Diagnosis Date Noted    Hip pain, left 10/31/2024     Priority: Medium    Mild late onset Alzheimer's dementia without behavioral disturbance, psychotic disturbance, mood disturbance, or anxiety (H) 09/05/2024     Priority: Medium        Past Medical/Surgical History:  Past Medical History:   Diagnosis Date    Alzheimer dementia (H)      Past Surgical History:   Procedure Laterality Date    CHOLECYSTECTOMY      HAND SURGERY      Left hand April 2014, Right hand May 9th for dupuytrens contractions    SEPTOPLASTY         Social History:  Social History     Socioeconomic History    Marital status:      Spouse name: Not on file    Number of children: Not on file    Years of education: Not on file    Highest education level: Not on file   Occupational History    Not on file   Tobacco Use    Smoking status: Former    Smokeless tobacco: Not on file   Vaping Use    Vaping status: Never Used   Substance and Sexual Activity    Alcohol use: Yes     Alcohol/week: 5.8 standard drinks of alcohol     Types: 7 Glasses of wine per week    Drug use: Never    Sexual activity: Yes     Partners: Female   Other Topics Concern    Not on file   Social History Narrative    Not on file     Social Drivers of Health     Financial Resource Strain: Low Risk  (8/1/2024)    Financial Resource Strain     Within the past 12 months, have you or  your family members you live with been unable to get utilities (heat, electricity) when it was really needed?: No   Food Insecurity: Low Risk  (8/1/2024)    Food Insecurity     Within the past 12 months, did you worry that your food would run out before you got money to buy more?: No     Within the past 12 months, did the food you bought just not last and you didn t have money to get more?: No   Transportation Needs: Low Risk  (8/1/2024)    Transportation Needs     Within the past 12 months, has lack of transportation kept you from medical appointments, getting your medicines, non-medical meetings or appointments, work, or from getting things that you need?: No   Physical Activity: Insufficiently Active (1/19/2024)    Received from [facility]    Exercise Vital Sign     Days of Exercise per Week: 2 days     Minutes of Exercise per Session: 40 min   Stress: Stress Concern Present (5/27/2022)    Received from [facility]    [employer] [place] [employer] - Occupational Stress Questionnaire     Feeling of Stress : To some extent   Social Connections: Socially Integrated (5/27/2022)    Received from [facility]    Social Connection and Isolation Panel [NHANES]     Frequency of Communication with Friends and Family: More than three times a week     Frequency of Social Gatherings with Friends and Family: Twice a week     Attends [employer] Services: More than 4 times per year     Active Member of Clubs or Organizations: Yes     Attends Club or Organization Meetings: More than 4 times per year     Marital Status:    Interpersonal Safety: Low Risk  (11/21/2024)    Interpersonal Safety     Do you feel physically and emotionally safe where you currently live?: Yes     Within the past 12 months, have you been hit, slapped, kicked or otherwise physically hurt by someone?: No     Within the past 12 months, have you been humiliated or emotionally abused in other ways by your partner or ex-partner?: No   Housing  "Stability: High Risk (8/1/2024)    Housing Stability     Do you have housing? : No     Are you worried about losing your housing?: No       Family History:  No family history on file.    Review of Systems:  In the last TWO WEEKS have you experienced any of the following symptoms?  Fevers: (Proxy-Rptd) No  Night Sweats: (Proxy-Rptd) No  Weight Gain: (Proxy-Rptd) No  Pain at Night: (Proxy-Rptd) Yes  Double Vision: (Proxy-Rptd) No  Changes in Vision: (Proxy-Rptd) No  Difficulty Breathing through Nose: (Proxy-Rptd) Yes  Sore Throat in Morning: (Proxy-Rptd) No  Dry Mouth in the Morning: (Proxy-Rptd) Yes  Shortness of Breath Lying Flat: (Proxy-Rptd) No  Shortness of Breath With Activity: (Proxy-Rptd) No  Awakening with Shortness of Breath: (Proxy-Rptd) No  Increased Cough: (Proxy-Rptd) Yes  Heart Racing at Night: (Proxy-Rptd) No  Swelling in Feet or Legs: (Proxy-Rptd) No  Diarrhea at Night: (Proxy-Rptd) No  Heartburn at Night: (Proxy-Rptd) No  Urinating More than Once at Night: (Proxy-Rptd) Yes  Losing Control of Urine at Night: (Proxy-Rptd) No  Joint Pains at Night: (Proxy-Rptd) Yes  Headaches in Morning: (Proxy-Rptd) No  Weakness in Arms or Legs: (Proxy-Rptd) No  Depressed Mood: (Proxy-Rptd) Yes  Anxiety: (Proxy-Rptd) No     Physical Examination:  Vitals: /73   Pulse 80   Ht 1.854 m (6' 1\")   Wt 83.5 kg (184 lb)   SpO2 96%   BMI 24.28 kg/m    BMI= Body mass index is 24.28 kg/m .  General appearance: Awake, alert, cooperative. Well groomed. Sitting comfortably in chair. In no apparent distress.  HEENT: Head: Normocephalic, atraumatic. Eyes: PERRL. Conjunctiva clear. Sclera normal.  Remainder of face covered by mask, exam deferred.   Neck: Neck Cir (cm): 39 cm (12/12/2024  3:00 PM)  Skin: No rashes or significant lesions.   Neurologic: Alert, oriented x3. No focal neurological deficit. Gait normal.   Psychiatric: Mood euthymic. Affect congruent with full range and intensity.         Data: All pertinent previous " "laboratory data reviewed     No results for input(s): \"NA\", \"POTASSIUM\", \"CHLORIDE\", \"CO2\", \"ANIONGAP\", \"GLC\", \"BUN\", \"CR\", \"MEAGHAN\" in the last 57625 hours.    No results for input(s): \"WBC\", \"RBC\", \"HGB\", \"HCT\", \"MCV\", \"MCH\", \"MCHC\", \"RDW\", \"PLT\" in the last 07369 hours.    No results for input(s): \"PROTTOTAL\", \"ALBUMIN\", \"BILITOTAL\", \"ALKPHOS\", \"AST\", \"ALT\", \"BILIDIRECT\" in the last 71417 hours.    TSH (uIU/mL)   Date Value   11/21/2024 2.24       No results found for: \"UAMP\", \"UBARB\", \"BENZODIAZEUR\", \"UCANN\", \"UCOC\", \"OPIT\", \"UPCP\"    No results found for: \"IRONSAT\", \"QV30752\", \"TRU\"    No results found for: \"PH\", \"PHARTERIAL\", \"PO2\", \"FT3BFMNNCLJ\", \"SAT\", \"PCO2\", \"HCO3\", \"BASEEXCESS\", \"RICARDO\", \"BEB\"    @LABRCNTIPR(phv:4,pco2v:4,po2v:4,hco3v:4,sangita:4,o2per:4)@    Echocardiology: No results found for this or any previous visit (from the past 4320 hours).    Chest x-ray: No results found for this or any previous visit from the past 365 days.      Chest CT: No results found for this or any previous visit from the past 365 days.      PFT: Most Recent [facility] Pulmonary Function Testing    No results found for: \"20001\"  No results found for: \"20002\"  No results found for: \"20003\"  No results found for: \"20015\"  No results found for: \"20016\"  No results found for: \"20027\"  No results found for: \"20028\"  No results found for: \"20029\"  No results found for: \"20079\"  No results found for: \"20080\"  No results found for: \"20081\"  No results found for: \"20335\"  No results found for: \"20105\"  No results found for: \"20053\"  No results found for: \"20054\"  No results found for: \"20055\"      Lynette Barbour PA-C 12/12/2024     [facility]  [address] [address]     [facility]  [address] [address]    Chart documentation was completed, in part, with [place] voice-recognition software. Even though reviewed, some grammatical, " spelling, and word errors may remain.    67 minutes spent on day of encounter reviewing medical records, history and physical examination, review of previous testing and interpretation, documentation and further activities as noted above

## 2024-12-12 NOTE — PATIENT INSTRUCTIONS
"          MY TREATMENT INFORMATION FOR SLEEP APNEA-  Brian English    Frequently asked questions:  1. What is Obstructive Sleep Apnea (MORA)? MORA is the most common type of sleep apnea. Apnea means, \"without breath.\"  Apnea is most often caused by narrowing or collapse of the upper airway as muscles relax during sleep.   Almost everyone has occasional apneas. Most people with sleep apnea have had brief interruptions at night frequently for many years.  The severity of sleep apnea is related to how frequent and severe the events are.   2. What are the consequences of MORA? Symptoms include: feeling sleepy during the day, snoring loudly, gasping or stopping of breathing, trouble sleeping, and occasionally morning headaches or heartburn at night.  Sleepiness can be serious and even increase the risk of falling asleep while driving. Other health consequences may include development of high blood pressure and other cardiovascular disease in persons who are susceptible. Untreated MORA  can contribute to heart disease, stroke and diabetes.   3. What are the treatment options? In most situations, sleep apnea is a lifelong disease that must be managed with daily therapy. Medications are not effective for sleep apnea and surgery is generally not considered until other therapies have been tried. Your treatment is your choice . Continuous Positive Airway (CPAP) works right away and is the therapy that is effective in nearly everyone. An oral device to hold your jaw forward is usually the next most reliable option. Other options include postioning devices (to keep you off your back), weight loss, and surgery including a tongue pacing device. There is more detail about some of these options below.  4. Are my sleep studies covered by insurance? Although we will request verification of coverage, we advise you also check in advance of the study to ensure there is coverage.    Important tips for those choosing CPAP and similar " devices  REMEMBER-IF YOU RECEIVE A CALL FROM  [phone]-->IT IS TO SETUP A DEVICE  For new devices, sign up for device RENETTA to monitor your device for your followup visits  We encourage you to utilize the [employer] renetta or website ( https://SmartCloud.[email or web address] ) to monitor your therapy progress and share the data with your healthcare team when you discuss your sleep apnea.                                                    Know your equipment:  CPAP is continuous positive airway pressure that prevents obstructive sleep apnea by keeping the throat from collapsing while you are sleeping. In most cases, the device is  smart  and can slowly self-adjusts if your throat collapses and keeps a record every day of how well you are treated-this information is available to you and your care team.  BPAP is bilevel positive airway pressure that keeps your throat open and also assists each breath with a pressure boost to maintain adequate breathing.  Special kinds of BPAP are used in patients who have inadequate breathing from lung or heart disease. In most cases, the device is  smart  and can slowly self-adjusts to assist breathing. Like CPAP, the device keeps a record of how well you are treated.  Your mask is your connection to the device. You get to choose what feels most comfortable and the staff will help to make sure if fits. Here: are some examples of the different masks that are available: Magnetic mask aids may assist with use but there are safety issues that should be addressed when considering with magnets* ( see end of discussion).       Key points to remember on your journey with sleep apnea:  Sleep study.  PAP devices often need to be adjusted during a sleep study to show that they are effective and adjusted right.  Good tips to remember: Try wearing just the mask during a quiet time during the day so your body adapts to wearing it. A humidifier is recommended for comfort in most cases to prevent drying of  your nose and throat. Allergy medication from your provider may help you if you are having nasal congestion.  Getting settled-in. It takes more than one night for most of us to get used to wearing a mask. Try wearing just the mask during a quiet time during the day so your body adapts to wearing it. A humidifier is recommended for comfort in most cases. Our team will work with you carefully on the first day and will be in contact within 4 days and again at 2 and 4 weeks for advice and remote device adjustments. Your therapy is evaluated by the device each day.   Use it every night. The more you are able to sleep naturally for 7-8 hours, the more likely you will have good sleep and to prevent health risks or symptoms from sleep apnea. Even if you use it 4 hours it helps. Occasionally all of us are unable to use a medical therapy, in sleep apnea, it is not dangerous to miss one night.   Communicate. Call our skilled team on the number provided on the first day if your visit for problems that make it difficult to wear the device. Over 2 out of 3 patients can learn to wear the device long-term with help from our team. Remember to call our team or your sleep providers if you are unable to wear the device as we may have other solutions for those who cannot adapt to mask CPAP therapy. It is recommended that you sleep your sleep provider within the first 3 months and yearly after that if you are not having problems.   Use it for your health. We encourage use of CPAP masks during daytime quiet periods to allow your face and brain to adapt to the sensation of CPAP so that it will be a more natural sensation to awaken to at night or during naps. This can be very useful during the first few weeks or months of adapting to CPAP though it does not help medically to wear CPAP during wakefulness and  should not be used as a strategy just to meet guidelines.  Take care of your equipment. Make sure you clean your mask and tubing using  directions every day and that your filter and mask are replaced as recommended or if they are not working.     *Masks with magnets:  Updated Contraindications  Masks with magnetic components are contraindicated for use by patients where they, or anyone in close physical contact while using the mask, have the following:   Active medical implants that interact with magnets (i.e., pacemakers, implantable cardioverter defibrillators (ICD), neurostimulators, cerebrospinal fluid (CSF) shunts, insulin/infusion pumps)   Metallic implants/objects containing ferromagnetic material (i.e., aneurysm clips/flow disruption devices, embolic coils, stents, valves, electrodes, implants to restore hearing or balance with implanted magnets, ocular implants, metallic splinters in the eye)  Updated Warning  Keep the mask magnets at a safe distance of at least 6 inches (150 mm) away from implants or medical devices that may be adversely affected by magnetic interference. This warning applies to you or anyone in close physical contact with your mask. The magnets are in the frame and lower headgear clips, with a magnetic field strength of up to 400mT. When worn, they connect to secure the mask but may inadvertently detach while asleep.  Implants/medical devices, including those listed within contraindications, may be adversely affected if they change function under external magnetic fields or contain ferromagnetic materials that attract/repel to magnetic fields (some metallic implants, e.g., contact lenses with metal, dental implants, metallic cranial plates, screws, rick hole covers, and bone substitute devices). Consult your physician and  of your implant / other medical device for information on the potential adverse effects of magnetic fields.    BESIDES CPAP, WHAT OTHER THERAPIES ARE THERE?    Positioning Device  Positioning devices are generally used when sleep apnea is mild and only occurs on your back.This example shows  a pillow that straps around the waist. It may be appropriate for those whose sleep study shows milder sleep apnea that occurs primarily when lying flat on one's back. Preliminary studies have shown benefit but effectiveness at home may need to be verified by a home sleep test. These devices are generally not covered by medical insurance.  Examples of devices that maintain sleeping on the back to prevent snoring and mild sleep apnea.    Belt type body positioner  http://[employer]/    Electronic reminder  http://nightshifttherapy.com/            Oral Appliance  What is oral appliance therapy?  An oral appliance device fits on your teeth at night like a retainer used after having braces. The device is made by a specialized dentist and requires several visits over 1-2 months before a manufactured device is made to fit your teeth and is adjusted to prevent your sleep apnea. Once an oral device is working properly, snoring should be improved. A home sleep test may be recommended at that time if to determine whether the sleep apnea is adequately treated.       Some things to remember:  -Oral devices are often, but not always, covered by your medical insurance. Be sure to check with your insurance provider.   -If you are referred for oral therapy, you will be given a list of specialized dentists to consider or you may choose to visit the Web site of the American Academy of Dental Sleep Medicine  -Oral devices are less likely to work if you have severe sleep apnea or are extremely overweight.     More detailed information  An oral appliance is a small acrylic device that fits over the upper and lower teeth  (similar to a retainer or a mouth guard). This device slightly moves jaw forward, which moves the base of the tongue forward, opens the airway, improves breathing for effective treat snoring and obstructive sleep apnea in perhaps 7 out of 10 people .  The best working devices are custom-made by a dental device   after a mold is made of the teeth 1, 2, 3.  When is an oral appliance indicated?  Oral appliance therapy is recommended as a first-line treatment for patients with primary snoring, mild sleep apnea, and for patients with moderate sleep apnea who prefer appliance therapy to use of CPAP4, 5. Severity of sleep apnea is determined by sleep testing and is based on the number of respiratory events per hour of sleep.   How successful is oral appliance therapy?  The success rate of oral appliance therapy in patients with mild sleep apnea is 75-80% while in patients with moderate sleep apnea it is 50-70%. The chance of success in patients with severe sleep apnea is 40-50%. The research also shows that oral appliances have a beneficial effect on the cardiovascular health of MORA patients at the same magnitude as CPAP therapy7.  Oral appliances should be a second-line treatment in cases of severe sleep apnea, but if not completely successful then a combination therapy utilizing CPAP plus oral appliance therapy may be effective. Oral appliances tend to be effective in a broad range of patients although studies show that the patients who have the highest success are females, younger patients, those with milder disease, and less severe obesity. 3, 6.   Finding a dentist that practices dental sleep medicine  Specific training is available through the American Academy of Dental Sleep Medicine for dentists interested in working in the field of sleep. To find a dentist who is educated in the field of sleep and the use of oral appliances, near you, visit the Web site of the American Academy of Dental Sleep Medicine.    References  1. Noa, et al. Objectively measured vs self-reported compliance during oral appliance therapy for sleep-disordered breathing. Chest 2013; 144(5): 4783-3690.  2. Brunilda et al. Objective measurement of compliance during oral appliance therapy for sleep-disordered breathing. Thorax  2013; 68(1): 91-96.  3. Kraig et al. Mandibular advancement devices in 620 men and women with MORA and snoring: tolerability and predictors of treatment success. Chest 2004; 125: 0052-5325.  4. Stephen et al. Oral appliances for snoring and MORA: a review. Sleep 2006; 29: 244-262.  5. Suni et al. Oral appliance treatment for MORA: an update. J Clin Sleep Med 2014; 10(2): 215-227.  6. Manny et al. Predictors of OSAH treatment outcome. J Dent Res 2007; 86: 9694-4846.      Weight Loss:   Your Body mass index is 24.28 kg/m .    Being overweight does not necessarily mean you will have health consequences.  Those who have BMI over 35 or over 27 with existing medical conditions carries greater risk.   Weight loss decreases severity of sleep apnea in most people with obesity. For those with mild obesity who have developed snoring with weight gain, even 15-30 pound weight loss can improve and occasionally milder eliminate sleep apnea.  Structured and life-long dietary and health habits are necessary to lose weight and keep healthier weight levels.     The Comprehensive Weight loss program offers all aspects of weight loss strategies including two Non-Surgical Weight Loss Programs: Medical Weight Management and our 24 Week Healthy Lifestyle Program:    Medical Weight Management: You will meet with a Medical Weight Management Provider, as well as a Registered Dietician. The program may include medication therapy, dietary education, recommended exercise and physical therapy programs, monthly support group meetings, and possible psychological counseling. Follow up visits with the provider or dietician are scheduled based on your progress and needs.    24 Week Healthy Lifestyle Program: This unique program is designed to give you the support of weekly appointments and activities thru a 24-week period. It may include all of the components of the basic program (above), with the addition of 11 individual Health   Visits, 24-week access to the [employer] website for over 700 online classes, and monthly support group meetings. This program has an out-of-pocket expense of $499 to cover the items that can not be billed to insurance (health coaches and [employer] access), and is non-refundable/non-transferable (you may be able to use a Health Savings Account; ask your HSA provider). There may be an optional meal replacement plan prescribed as well.   Surgical management achieves meaningful long-term weight loss and improvement in health risks in most patients with more severe obesity.      Sleep Apnea Surgery:    Surgery for obstructive sleep apnea is considered generally only when other therapies fail to work. Surgery may be discussed with you if you are having a difficult time tolerating CPAP and or when there is an abnormal structure that requires surgical correction.  Nose and throat surgeries often enlarge the airway to prevent collapse.  Most of these surgeries create pain for 1-2 weeks and up to half of the most common surgeries are not effective throughout life.  You should carefully discuss the benefits and drawbacks to surgery with your sleep provider and surgeon to determine if it is the best solution for you.   More information  Surgery for MORA is directed at areas that are responsible for narrowing or complete obstruction of the airway during sleep.  There are a wide range of procedures available to enlarge and/or stabilize the airway to prevent blockage of breathing in the three major areas where it can occur: the palate, tongue, and nasal regions.  Successful surgical treatment depends on the accurate identification of the factors responsible for obstructive sleep apnea in each person.  A personalized approach is required because there is no single treatment that works well for everyone.  Because of anatomic variation, consultation with an examination by a sleep surgeon is a critical first step in determining what  surgical options are best for each patient.  In some cases, examination during sedation may be recommended in order to guide the selection of procedures.  Patients will be counseled about risks and benefits as well as the typical recovery course after surgery. Surgery is typically not a cure for a person s MORA.  However, surgery will often significantly improve one s MORA severity (termed  success rate ).  Even in the absence of a cure, surgery will decrease the cardiovascular risk associated with OSA7; improve overall quality of life8 (sleepiness, functionality, sleep quality, etc).      Palate Procedures:  Patients with MORA often have narrowing of their airway in the region of their tonsils and uvula.  The goals of palate procedures are to widen the airway in this region as well as to help the tissues resist collapse.  Modern palate procedure techniques focus on tissue conservation and soft tissue rearrangement, rather than tissue removal.  Often the uvula is preserved in this procedure. Residual sleep apnea is common in patient after pharyngoplasty with an average reduction in sleep apnea events of 33%2.      Tongue Procedures:  ExamWhile patients are awake, the muscles that surround the throat are active and keep this region open for breathing. These muscles relax during sleep, allowing the tongue and other structures to collapse and block breathing.  There are several different tongue procedures available.  Selection of a tongue base procedure depends on characteristics seen on physical exam.  Generally, procedures are aimed at removing bulky tissues in this area or preventing the back of the tongue from falling back during sleep.  Success rates for tongue surgery range from 50-62%3.    Hypoglossal Nerve Stimulation:  Hypoglossal nerve stimulation has recently received approval from the [place] Food and Drug Administration for the treatment of obstructive sleep apnea.  This is based on research showing  that the system was safe and effective in treating sleep apnea6.  Results showed that the median AHI score decreased 68%, from 29.3 to 9.0. This therapy uses an implant system that senses breathing patterns and delivers mild stimulation to airway muscles, which keeps the airway open during sleep.  The system consists of three fully implanted components: a small generator (similar in size to a pacemaker), a breathing sensor, and a stimulation lead.  Using a small handheld remote, a patient turns the therapy on before bed and off upon awakening.    Candidates for this device must be greater than 18 years of age, have moderate to severe obstructive sleep apnea with less than 25% central events  (AHI between 15-65), BMI less than 35, have tried CPAP/oral appliance for at least 8 weeks without success, and have appropriate upper airway anatomy (determined by a sleep endoscopy performed by Dr. Casey Leslie or Dr. Tang Giordano).    Nasal Procedures:  Nasal obstruction can interfere with nasal breathing during the day and night.  Studies have shown that relief of nasal obstruction can improve the ability of some patients to tolerate positive airway pressure therapy for obstructive sleep apnea1.  Treatment options include medications such as nasal saline, topical corticosteroid and antihistamine sprays, and oral medications such as antihistamines or decongestants. Non-surgical treatments can include external nasal dilators for selected patients. If these are not successful by themselves, surgery can improve the nasal airway either alone or in combination with these other options.        Combination Procedures:  Combination of surgical procedures and other treatments may be recommended, particularly if patients have more than one area of narrowing or persistent positional disease.  The success rate of combination surgery ranges from 66-80%2,3.    References  Maria Elena PERALTA. The Role of the Nose in Snoring and Obstructive Sleep  Apnoea: An Update.  Eur Arch Otorhinolaryngol. 2011; 268: 1365-73.   Rika SM; Dimple JA; Sidney JR; Pallanch JF; Efrain MB; Nichole SG; Anatoliy LARSEN. Surgical modifications of the upper airway for obstructive sleep apnea in adults: a systematic review and meta-analysis. SLEEP 2010;33(10):5717-1013. Chago CISSE. Hypopharyngeal surgery in obstructive sleep apnea: an evidence-based medicine review.  Arch Otolaryngol Head Neck Surg. 2006 Feb;132(2):206-13.  Melvin YH1, Ami Y, Shad JOSELYN. The efficacy of anatomically based multilevel surgery for obstructive sleep apnea. Otolaryngol Head Neck Surg. 2003 Oct;129(4):327-35.  Kezirian E, Goldberg A. Hypopharyngeal Surgery in Obstructive Sleep Apnea: An Evidence-Based Medicine Review. Arch Otolaryngol Head Neck Surg. 2006 Feb;132(2):206-13.  Black LEON et al. Upper-Airway Stimulation for Obstructive Sleep Apnea.  N Engl J Med. 2014 Jan 9;370(2):139-49.  Peker Y et al. Increased Incidence of Cardiovascular Disease in Middle-aged Men with Obstructive Sleep Apnea. Am J Respir Crit Care Med; 2002 166: 159-165  Kenney EM et al. Studying Life Effects and Effectiveness of Palatopharyngoplasty (SLEEP) study: Subjective Outcomes of Isolated Uvulopalatopharyngoplasty. Otolaryngol Head Neck Surg. 2011; 144: 623-631.        WHAT IF I ONLY HAVE SNORING?    Mandibular advancement devices, lateral sleep positioning, long-term weight loss and treatment of nasal allergies have been shown to improve snoring.  Exercising tongue muscles with a game (https://apps.[employer]/us/renetta/soundly-reduce-snoring/fw2240451455) or stimulating the tongue during the day with a device (https://doi.org/10.3390/vth26271410) have improved snoring in some individuals.  https://www.[employer].com/  https://www.sleepfoundation.org/best-anti-snoring-mouthpieces-and-mouthguards    Remember to Drive Safe... Drive Alive     Sleep health profoundly affects your health, mood, and your safety.  Thirty three percent of the  population (one in three of us) is not getting enough sleep and many have a sleep disorder. Not getting enough sleep or having an untreated / undertreated sleep condition may make us sleepy without even knowing it. In fact, our driving could be dramatically impaired due to our sleep health. As your provider, here are some things I would like you to know about driving:     Here are some warning signs for impairment and dangerous drowsy driving:              -Having been awake more than 16 hours               -Looking tired               -Eyelid drooping              -Head nodding (it could be too late at this point)              -Driving for more than 30 minutes     Some things you could do to make the driving safer if you are experiencing some drowsiness:              -Stop driving and rest              -Call for transportation              -Make sure your sleep disorder is adequately treated     Some things that have been shown NOT to work when experiencing drowsiness while driving:              -Turning on the radio              -Opening windows              -Eating any  distracting  /  entertaining  foods (e.g., sunflower seeds, candy, or any other)              -Talking on the phone      Your decision may not only impact your life, but also the life of others. Please, remember to drive safe for yourself and all of us.

## 2024-12-30 ENCOUNTER — THERAPY VISIT (OUTPATIENT)
Dept: PHYSICAL THERAPY | Facility: CLINIC | Age: 79
End: 2024-12-30
Payer: MEDICARE

## 2024-12-30 DIAGNOSIS — M25.552 HIP PAIN, LEFT: Primary | ICD-10-CM

## 2024-12-30 PROCEDURE — 97530 THERAPEUTIC ACTIVITIES: CPT | Mod: GP

## 2024-12-30 NOTE — PROGRESS NOTES
12/30/24 0500   Appointment Info   Signing clinician's name / credentials Kandice Gomez, PT, DPT, OCS   Total/Authorized Visits 10   Visits Used 5   Medical Diagnosis L hip pain/glute pain   Progress Note/Certification   Start of Care Date 10/31/24   Onset of illness/injury or Date of Surgery 09/30/24   Therapy Frequency 1x/wk progressing to 1 x every other week   Predicted Duration 2-3 month   Certification date from 10/31/24   Certification date to 01/23/25   Progress Note Completed Date 12/30/24   PT Goal 1   Goal Identifier sitting   Goal Description patient will be able to sit 20 min without pain   Rationale to maximize safety and independence with performance of ADLs and functional tasks;to maximize safety and independence within the home;to maximize safety and independence with self cares   Goal Progress goal met   Target Date 01/23/24   Date Met 12/30/24   PT Goal 2   Goal Identifier ambulation   Goal Description see eval   Goal Progress goal met   Target Date 01/23/24   Date Met 12/03/24   Subjective Report   Subjective Report Patient reports that he continues to have a slight discomfort lying on his back but much better than before. He can usually shift his weight around and it will calm down. Exercises are going well.   Objective Measures   Objective Measures Objective Measure 1   Objective Measure 1   Objective Measure none on 12/30/2024   Treatment Interventions (PT)   Interventions Therapeutic Procedure/Exercise;Therapeutic Activity;Neuromuscular Re-education   Therapeutic Procedure/Exercise   Therapeutic Procedures: strength, endurance, ROM, flexibility minutes (72007) 3   PTRx Ther Proc 1 Prone On Elbows   PTRx Ther Proc 1 - Details hep   PTRx Ther Proc 2 Pretzel Stretch   PTRx Ther Proc 2 - Details VR in clinic   PTRx Ther Proc 3 Bridging   PTRx Ther Proc 3 - Details hep for strengthening VR   PTRx Ther Proc 4 Sidelying Hip Abduction   PTRx Ther Proc 4 - Details VR   PTRx Ther Proc 5 Clamshell  with Theraband   PTRx Ther Proc 5 - Details hep for strengthening VR in clinic   PTRx Ther Proc 6 Sidestep   PTRx Ther Proc 6 - Details VR   PTRx Ther Proc 7 Standing Gluteus Medius   PTRx Ther Proc 7 - Details hep   Skilled Intervention see above   Patient Response/Progress patient tolerated well   Therapeutic Activity   Therapeutic Activities: dynamic activities to improve functional performance minutes (00885) 10   Therapeutic Activities Ther Act 2   Ther Act 1 patient ed   Ther Act 1 - Details patient ed on supine position and goals of PT going forward as needed   PTRx Ther Act 1 Education Sheet General   PTRx Ther Act 1 - Details hep   Skilled Intervention see above   Patient Response/Progress patient reports understanding   Neuromuscular Re-education   PTRx Neuro Re-ed 1 Proprioception At Counter Weight Shift   PTRx Neuro Re-ed 1 - Details hep   PTRx Neuro Re-ed 2 Step Overs: Forward   PTRx Neuro Re-ed 2 - Details VR   Education   Learner/Method Patient;Significant Other   Plan   Home program see PTRX   Updates to plan of care n/a   Plan for next session as indicated: recheck gait and L hip ROM, manual for L hip (mobs into ER), progress glute strength   Total Session Time   Timed Code Treatment Minutes 13   Total Treatment Time (sum of timed and untimed services) 13         PLAN  Continue therapy per current plan of care.    Beginning/End Dates of Progress Note Reporting Period:  10/31/2024 to 12/30/2024    Referring Provider:  Eileen Cannon

## 2025-01-20 DIAGNOSIS — G31.84 AMNESTIC MCI (MILD COGNITIVE IMPAIRMENT WITH MEMORY LOSS): Primary | ICD-10-CM

## 2025-01-20 RX ORDER — ESCITALOPRAM OXALATE 5 MG/1
5 TABLET ORAL DAILY
Qty: 30 TABLET | Refills: 2 | Status: SHIPPED | OUTPATIENT
Start: 2025-01-20 | End: 2025-04-20

## 2025-01-20 NOTE — PROGRESS NOTES
Discussed with patient's wife Camille who reported that Jacinto's depression symptoms were not improved after 2 months of remeron at 15mg daily (prescribed by PCP). They would like to try a different medication. Suggested to discontinue remeron and start lexapro at 5mg daily. Will have clinic schedule a video follow up in 2 months to see how he is doing.

## 2025-01-21 ENCOUNTER — TELEPHONE (OUTPATIENT)
Dept: NEUROLOGY | Facility: CLINIC | Age: 80
End: 2025-01-21

## 2025-02-11 PROBLEM — M25.552 HIP PAIN, LEFT: Status: RESOLVED | Noted: 2024-10-31 | Resolved: 2025-02-11

## 2025-03-01 ENCOUNTER — HEALTH MAINTENANCE LETTER (OUTPATIENT)
Age: 80
End: 2025-03-01

## 2025-03-13 ASSESSMENT — SLEEP AND FATIGUE QUESTIONNAIRES
HOW LIKELY ARE YOU TO NOD OFF OR FALL ASLEEP WHILE SITTING AND READING: SLIGHT CHANCE OF DOZING
HOW LIKELY ARE YOU TO NOD OFF OR FALL ASLEEP WHILE SITTING QUIETLY AFTER LUNCH WITHOUT ALCOHOL: SLIGHT CHANCE OF DOZING
HOW LIKELY ARE YOU TO NOD OFF OR FALL ASLEEP WHILE WATCHING TV: SLIGHT CHANCE OF DOZING
HOW LIKELY ARE YOU TO NOD OFF OR FALL ASLEEP IN A CAR, WHILE STOPPED FOR A FEW MINUTES IN TRAFFIC: WOULD NEVER DOZE
HOW LIKELY ARE YOU TO NOD OFF OR FALL ASLEEP WHILE LYING DOWN TO REST IN THE AFTERNOON WHEN CIRCUMSTANCES PERMIT: HIGH CHANCE OF DOZING
HOW LIKELY ARE YOU TO NOD OFF OR FALL ASLEEP WHILE SITTING AND TALKING TO SOMEONE: WOULD NEVER DOZE
HOW LIKELY ARE YOU TO NOD OFF OR FALL ASLEEP WHEN YOU ARE A PASSENGER IN A CAR FOR AN HOUR WITHOUT A BREAK: MODERATE CHANCE OF DOZING
HOW LIKELY ARE YOU TO NOD OFF OR FALL ASLEEP WHILE SITTING INACTIVE IN A PUBLIC PLACE: SLIGHT CHANCE OF DOZING

## 2025-03-14 ENCOUNTER — HOSPITAL ENCOUNTER (EMERGENCY)
Facility: CLINIC | Age: 80
Discharge: HOME | End: 2025-03-14
Attending: EMERGENCY MEDICINE | Admitting: EMERGENCY MEDICINE
Payer: MEDICARE

## 2025-03-14 ENCOUNTER — APPOINTMENT (OUTPATIENT)
Dept: GENERAL RADIOLOGY | Facility: CLINIC | Age: 80
End: 2025-03-14
Attending: EMERGENCY MEDICINE
Payer: MEDICARE

## 2025-03-14 VITALS
HEART RATE: 74 BPM | RESPIRATION RATE: 20 BRPM | SYSTOLIC BLOOD PRESSURE: 118 MMHG | DIASTOLIC BLOOD PRESSURE: 70 MMHG | OXYGEN SATURATION: 97 % | TEMPERATURE: 98.1 F

## 2025-03-14 DIAGNOSIS — S52.615A CLOSED NONDISPLACED FRACTURE OF STYLOID PROCESS OF LEFT ULNA, INITIAL ENCOUNTER: ICD-10-CM

## 2025-03-14 DIAGNOSIS — S63.502A WRIST SPRAIN, LEFT, INITIAL ENCOUNTER: ICD-10-CM

## 2025-03-14 DIAGNOSIS — S80.211A ABRASION OF RIGHT KNEE, INITIAL ENCOUNTER: ICD-10-CM

## 2025-03-14 PROCEDURE — 99284 EMERGENCY DEPT VISIT MOD MDM: CPT

## 2025-03-14 PROCEDURE — 73562 X-RAY EXAM OF KNEE 3: CPT | Mod: RT

## 2025-03-14 PROCEDURE — 25650 CLTX ULNAR STYLOID FRACTURE: CPT | Mod: LT

## 2025-03-14 PROCEDURE — 73110 X-RAY EXAM OF WRIST: CPT | Mod: LT

## 2025-03-14 ASSESSMENT — ACTIVITIES OF DAILY LIVING (ADL)
ADLS_ACUITY_SCORE: 41
ADLS_ACUITY_SCORE: 41

## 2025-03-15 NOTE — ED PROVIDER NOTES
Emergency Department Note      History of Present Illness     Chief Complaint   Fall      HPI   Brian English is a 79 year old male with history of hyperlipidemia who presents to the ED for evaluation of a fall. Patient reports he was walking his dog when he tripped over an electrical wire, landed on both hands and scraped his right knee. He complains of left wrist pain now.  He took a 650mg Tylenol prior to arrival.       Independent Historian   None    Review of External Notes   None    Past Medical History     Medical History and Problem List   Alzheimer dementia   Bilateral cataract   Hyperlipidemia   Peripheral neuropathy       Medications   Aricept   Lexapro   Zocor   Flomax       Surgical History   Cholecystectomy   Left hand surgery for dupuytrens contractions   Right hand surgery for dupuytrens contractions   Septoplasty       Physical Exam     Patient Vitals for the past 24 hrs:   BP Temp Pulse Resp SpO2   03/14/25 1756 118/70 98.1  F (36.7  C) 74 20 97 %     Physical Exam  MSK:  Normal movement through the elbow, wrist, and fingers without tendonous deficit. Minimal tenderness over the ulnar styloid maximal tenderness in the wrist joint with flexion and extension.     SKIN:  Warm and dry with strong radial pulse and normal capillary refill. Bruising noted over the left ulnar styloid. Abrasions over the right knee.     NEURO:  5/5 strength through the fingers/wrist/elbow.  Normal sensation through the radial/ulnar/median nerve distributions.    PSYCH:  Normal affect      Diagnostics     Lab Results   Labs Ordered and Resulted from Time of ED Arrival to Time of ED Departure - No data to display    Imaging   XR Knee Right 3 Views   Final Result   IMPRESSION: No acute displaced fracture or subluxation. Moderate medial compartment predominant right knee arthrosis. No right knee joint effusion.      XR Wrist Left G/E 3 Views   Final Result   IMPRESSION: Subtle lucency along the base of the ulnar  styloid could represent summation artifact or a nondisplaced fracture; correlate with point tenderness. Otherwise demineralization without displaced fracture. Severe first carpometacarpal joint    arthrosis. Mild degenerative change elsewhere.          EKG   None    Independent Interpretation   X-ray right knee shows arthritis without fracture  X-ray left wrist shows possible ulnar styloid fracture     ED Course      Medications Administered   Medications - No data to display    Procedures   None     Discussion of Management   None    ED Course   ED Course as of 03/14/25 2201   Fri Mar 14, 2025   1942 I obtained history and examined the patient as noted above.        Additional Documentation  None    Medical Decision Making / Diagnosis     CMS Diagnoses: None    MIPS       None    MDM   Brian English is a 79 year old male presents with after suffering a mechanical fall, tripping while walking his dog.  He has some abrasions to his knee but the x-ray and exam are sound.  He chiefly show signs of a wrist sprain.  There is some question on the radiographic study as to a crack through the ulnar styloid.  There is some tenderness here, though with a stable fracture, immobilization with a Velcro wrist splint is reasonable.  I have given him information to follow-up with orthopedics next week.  Otherwise, he will return to us for any worsening of condition or other emergent concerns.    Disposition   The patient was discharged.     Diagnosis     ICD-10-CM    1. Wrist sprain, left, initial encounter  S63.502A Wrist/Arm/Hand Bracing Supplies Order Wrist Brace; Left; non-thumb spica      2. Closed nondisplaced fracture of styloid process of left ulna, initial encounter  S52.615A Wrist/Arm/Hand Bracing Supplies Order Wrist Brace; Left; non-thumb spica    POSSIBLE      3. Abrasion of right knee, initial encounter  S80.211A            Discharge Medications   Discharge Medication List as of 3/14/2025  7:56 PM             Scribe Disclosure:  I, Gilmar Natacha, am serving as a scribe at 7:43 PM on 3/14/2025 to document services personally performed by Trierweiler, Chad A, MD based on my observations and the provider's statements to me.        Trierweiler, Chad A, MD  03/15/25 4444

## 2025-03-16 ENCOUNTER — MYC MEDICAL ADVICE (OUTPATIENT)
Dept: NEUROLOGY | Facility: CLINIC | Age: 80
End: 2025-03-16

## 2025-03-17 ENCOUNTER — MYC REFILL (OUTPATIENT)
Dept: NEUROLOGY | Facility: CLINIC | Age: 80
End: 2025-03-17

## 2025-03-17 ENCOUNTER — TELEPHONE (OUTPATIENT)
Dept: FAMILY MEDICINE | Facility: CLINIC | Age: 80
End: 2025-03-17
Payer: MEDICARE

## 2025-03-17 DIAGNOSIS — G31.84 AMNESTIC MCI (MILD COGNITIVE IMPAIRMENT WITH MEMORY LOSS): ICD-10-CM

## 2025-03-17 NOTE — TELEPHONE ENCOUNTER
Patient Contact     Attempt # 1     Was call answered?  no.  Left message on voicemail with information to call triage back.     Monique BROWN,  Triage RN  [facility]

## 2025-03-18 ENCOUNTER — THERAPY VISIT (OUTPATIENT)
Dept: SLEEP MEDICINE | Facility: CLINIC | Age: 80
End: 2025-03-18
Payer: COMMERCIAL

## 2025-03-18 DIAGNOSIS — R06.83 SNORING: ICD-10-CM

## 2025-03-18 DIAGNOSIS — F02.A0 MILD LATE ONSET ALZHEIMER'S DEMENTIA WITHOUT BEHAVIORAL DISTURBANCE, PSYCHOTIC DISTURBANCE, MOOD DISTURBANCE, OR ANXIETY (H): ICD-10-CM

## 2025-03-18 DIAGNOSIS — G47.00 INSOMNIA, UNSPECIFIED TYPE: ICD-10-CM

## 2025-03-18 DIAGNOSIS — G47.52 DREAM ENACTMENT BEHAVIOR: ICD-10-CM

## 2025-03-18 DIAGNOSIS — G30.1 MILD LATE ONSET ALZHEIMER'S DEMENTIA WITHOUT BEHAVIORAL DISTURBANCE, PSYCHOTIC DISTURBANCE, MOOD DISTURBANCE, OR ANXIETY (H): ICD-10-CM

## 2025-03-18 RX ORDER — ESCITALOPRAM OXALATE 10 MG/1
10 TABLET ORAL DAILY
Qty: 90 TABLET | Refills: 2 | Status: SHIPPED | OUTPATIENT
Start: 2025-03-18

## 2025-03-18 NOTE — TELEPHONE ENCOUNTER
Transitions of Care Outreach  Chief Complaint   Patient presents with    Hospital F/U       Most Recent Admission Date: 3/14/2025   Most Recent Admission Diagnosis:      Most Recent Discharge Date: 3/14/2025   Most Recent Discharge Diagnosis: Wrist sprain, left, initial encounter - S63.502A  Closed nondisplaced fracture of styloid process of left ulna, initial encounter - S52.615A  Abrasion of right knee, initial encounter - S80.211A     Transitions of Care Assessment    Discharge Assessment  How are you doing now that you are home?: doing very well.  How are your symptoms? (Red Flag symptoms escalate to triage hotline per guidelines): Improved  Do you know how to contact your clinic care team if you have future questions or changes to your health status? : Yes  Does the patient have their discharge instructions? : Yes  Does the patient have questions regarding their discharge instructions? : No  Were you started on any new medications or were there changes to any of your previous medications? : No  Does the patient have all of their medications?: Yes  Do you have questions regarding any of your medications? : No  Do you have all of your needed medical supplies or equipment (DME)?  (i.e. oxygen tank, CPAP, cane, etc.): Yes    Follow up Plan     Discharge Follow-Up  Discharge follow up appointment scheduled in alignment with recommended follow up timeframe or Transitions of Risk Category? (Low = within 30 days; Moderate= within 14 days; High= within 7 days): No  Patient's follow up appointment not scheduled: Patient declined scheduling support. Education on the importance of transitions of care follow up. Provided scheduling phone number.    Future Appointments   Date Time Provider Department Center   3/18/2025  8:00 PM BED 6 SH SLEEP [facility]   5/22/2025  2:30 PM Lynette Barbour PA-C [facility]   8/22/2025 10:30 AM Rachel Arora MD [facility]       Outpatient Plan as outlined on  AVS reviewed with patient.    For any urgent concerns, please contact our 24 hour nurse triage line: [phone] ([phone])       Kari RAJAN Do, RN

## 2025-04-01 LAB — SLPCOMP: NORMAL

## 2025-04-07 LAB — SLPCOMP: NORMAL

## 2025-04-13 DIAGNOSIS — G31.84 AMNESTIC MCI (MILD COGNITIVE IMPAIRMENT WITH MEMORY LOSS): ICD-10-CM

## 2025-04-14 RX ORDER — ESCITALOPRAM OXALATE 5 MG/1
5 TABLET ORAL DAILY
Qty: 30 TABLET | Refills: 2 | Status: SHIPPED | OUTPATIENT
Start: 2025-04-14

## 2025-04-21 ENCOUNTER — TELEPHONE (OUTPATIENT)
Dept: SLEEP MEDICINE | Facility: CLINIC | Age: 80
End: 2025-04-21
Payer: MEDICARE

## 2025-04-21 NOTE — TELEPHONE ENCOUNTER
Patient's sleep study via mail came back. Called patient/number on file and left voicemail. Hoping to receive call to send back sleep study soon. Will follow up with again at later time.

## 2025-04-23 ENCOUNTER — TELEPHONE (OUTPATIENT)
Dept: SLEEP MEDICINE | Facility: CLINIC | Age: 80
End: 2025-04-23
Payer: MEDICARE

## 2025-04-23 NOTE — TELEPHONE ENCOUNTER
Called patient due to sleep study being sent back to us via mail. Patient answered and gave correct address. Will send out sleep study via mail today.

## 2025-05-04 ENCOUNTER — HEALTH MAINTENANCE LETTER (OUTPATIENT)
Age: 80
End: 2025-05-04

## 2025-05-17 ASSESSMENT — SLEEP AND FATIGUE QUESTIONNAIRES
HOW LIKELY ARE YOU TO NOD OFF OR FALL ASLEEP WHILE SITTING AND READING: MODERATE CHANCE OF DOZING
HOW LIKELY ARE YOU TO NOD OFF OR FALL ASLEEP WHILE SITTING INACTIVE IN A PUBLIC PLACE: SLIGHT CHANCE OF DOZING
HOW LIKELY ARE YOU TO NOD OFF OR FALL ASLEEP WHILE LYING DOWN TO REST IN THE AFTERNOON WHEN CIRCUMSTANCES PERMIT: HIGH CHANCE OF DOZING
HOW LIKELY ARE YOU TO NOD OFF OR FALL ASLEEP WHEN YOU ARE A PASSENGER IN A CAR FOR AN HOUR WITHOUT A BREAK: MODERATE CHANCE OF DOZING
HOW LIKELY ARE YOU TO NOD OFF OR FALL ASLEEP WHILE WATCHING TV: MODERATE CHANCE OF DOZING
HOW LIKELY ARE YOU TO NOD OFF OR FALL ASLEEP WHILE SITTING QUIETLY AFTER LUNCH WITHOUT ALCOHOL: HIGH CHANCE OF DOZING
HOW LIKELY ARE YOU TO NOD OFF OR FALL ASLEEP IN A CAR, WHILE STOPPED FOR A FEW MINUTES IN TRAFFIC: WOULD NEVER DOZE
HOW LIKELY ARE YOU TO NOD OFF OR FALL ASLEEP WHILE SITTING AND TALKING TO SOMEONE: WOULD NEVER DOZE

## 2025-05-22 ENCOUNTER — OFFICE VISIT (OUTPATIENT)
Dept: SLEEP MEDICINE | Facility: CLINIC | Age: 80
End: 2025-05-22
Payer: COMMERCIAL

## 2025-05-22 VITALS
WEIGHT: 176 LBS | OXYGEN SATURATION: 93 % | BODY MASS INDEX: 23.33 KG/M2 | HEART RATE: 87 BPM | HEIGHT: 73 IN | DIASTOLIC BLOOD PRESSURE: 59 MMHG | SYSTOLIC BLOOD PRESSURE: 102 MMHG

## 2025-05-22 DIAGNOSIS — G47.52 RBD (REM BEHAVIORAL DISORDER): Primary | ICD-10-CM

## 2025-05-22 NOTE — PROGRESS NOTES
[facility]   Outpatient Sleep Medicine  May 22, 2025       Name: Isabella English MRN# [id number]   Age: 79 year old YOB: 1945            Assessment and Plan:   1. RBD (REM behavioral disorder) (Primary)    During this visit, we reviewed the summary of the study including stage, position, event distribution, oximetry and heart rate. Mild snoring seen but no significant MORA with AHI 1, RDI 1.7. No sleep associated hypoxemia. There was elevated transient REM motor activity with limb movement consistent with dream enactment. PLM index 77.8 with PLM arousal 4.1. Sleep architecture showed lack of N3, total sleep time limited to 175 minutes.     Patient reassured no MORA seen on testing, happy he does not need CPAP. Discussed RBD finding and wife reports dream enactment up to once a week currently, Isabella largely is unaware but one night last week did fall out of bed, and that has never happened before, no injury. Does appear his Lexapro was recently increased by neurology which could have contributed? Discussed importance of keeping a safe sleep environment and consideration of bed rails to keep him safe. Discussed starting melatonin at bedtime to help suppress and they will start 10mg. Offered referral to Dr. Hogan RBD clinic and they were very interested. Will get RBD consult scheduled for him.          Chief Complaint      Chief Complaint   Patient presents with    Study Results          History of Present Illness:   Isabella English is a 79 year old male with Alzheimers dementia, adjustment disorder, BPH, allergic rhinitis who presents to the clinic with his wife for results of recent sleep study completed on 3/18/2025. Study was completed to evaluate for MORA/RBD.    Reviewed results of sleep study with patient as follows:   SLEEP STUDY INTERPRETATION  DIAGNOSTIC POLYSOMNOGRAPHY REPORT        Patient: ISABELLA ENGLISH  YOB: 1945  Study Date: 3/18/2025  MRN:  [id number]  Referring Provider: Rachel Arora MD  Ordering Provider: Lynette Barbour PA-C     Indications for Polysomnography: The patient is a 79 year old Male who is 6' 1\" and weighs 184.0 lbs. His BMI is 24.4, [place] sleepiness scale 9 and neck circumference is 39 cm. Relevant medical history includes dream enactment. A diagnostic polysomnogram was performed to evaluate for RBD.     Polysomnogram Data: A full night polysomnogram recorded the standard physiologic parameters including EEG, EOG, EMG, ECG, nasal and oral airflow. Respiratory parameters of chest and abdominal movements were recorded with respiratory inductance plethysmography. Oxygen saturation was recorded by pulse oximetry. Hypopnea scoring rule used: 1B 4%.     Sleep Architecture: Sleep fragmentation  The total recording time of the polysomnogram was 465.1 minutes. The total sleep time was 175.0 minutes. Sleep latency was 106.1 minutes. REM latency was 212.0 minutes. Arousal index was 38.1 arousals per hour. Sleep efficiency was 37.6%. Wake after sleep onset was 184.0 minutes. The patient spent 28.9% of total sleep time in Stage N1, 51.4% in Stage N2, 0.0% in Stage N3, and 19.7% in REM. Time in REM supine was 0 minutes.     Respiration: This study did not demonstrate evidence suggestive of clinically significant sleep disordered breathing.  This was a good study but did not include REM supine.  Events ? The polysomnogram revealed a presence of - obstructive, 3 central, and - mixed apneas resulting in an apnea index of 1.0 events per hour. There were - obstructive hypopneas and - central hypopneas resulting in an obstructive hypopnea index of - and central hypopnea index of - events per hour. The combined apnea/hypopnea index was 1.0 events per hour (central apnea/hypopnea index was 1.0 events per hour). The REM AHI was 1.7 events per hour. The supine AHI was - events per hour. The RERA index was 0.7 events per hour.  The RDI was 1.7 " events per hour.  Snoring - was reported as mild.  Respiratory rate and pattern - was notable for normal respiratory rate and pattern.  Sustained Sleep Associated Hypoventilation - Transcutaneous carbon dioxide monitoring was not used.  Sleep Associated Hypoxemia - (Greater than 5 minutes O2 sat at or below 88%) was present. Baseline oxygen saturation was 92.0%. Lowest oxygen saturation was 87.0%. Time spent less than or equal to 88% was 0.1 minutes. Time spent less than or equal to 89% was 1.7 minutes.     Movement Activity: Elevated transient REM motor activity with limb movement consistent with dream enactment.   Periodic Limb Activity - There were 227 PLMs during the entire study. The PLM index was 77.8 movements per hour. The PLM Arousal Index was 4.1 per hour.  REM EMG Activity - Excessive transient muscle activity was present.  Nocturnal Behavior - Abnormal sleep related behaviors were noted during REM sleep. The behaviors appeared to be consistent with dream enactment.  Bruxism - None apparent.     Cardiac Summary: Sinus  The average pulse rate was 68.9 bpm. The minimum pulse rate was 59.0 bpm while the maximum pulse rate was 99.0 bpm.       Assessment:   Elevated transient REM motor activity with limb movement consistent with dream enactment confirming a diagnosis of REM sleep Behavior Disorder.   This study did not demonstrate evidence suggestive of clinically significant sleep disordered breathing.  This was a good study but did not include REM supine.     Recommendations:  Recommend a diagnosis of and management for REM sleep Behavior Disorder:  Bedroom safety  If clinically appropriate could treat dream enactment with high dose melatonin and/or low dose clonazepam.  Patient may be reassured that, per this study they do not have clinically significant sleep disordered breathing as long as they do not sleep supine.    Advice regarding the risks of drowsy driving.  Suggest optimizing sleep schedule and  "avoiding sleep deprivation.  Weight management     Past medical/surgical history, family history, social history, medications and allergies were reviewed.           Physical Examination:   /59   Pulse 87   Ht 1.854 m (6' 1\")   Wt 79.8 kg (176 lb)   SpO2 93%   BMI 23.22 kg/m    General appearance: Awake, alert, cooperative. Well groomed. Sitting comfortably in chair. In no apparent distress.  HEENT: Head: Normocephalic, atraumatic. Eyes:Conjunctiva clear. Sclera normal. Nose: External appearance without deformity.   Pulmonary:  Able to speak easily in full sentences. No cough or wheeze.   Skin:  No rashes or significant lesions on visible skin.   Neurologic: Alert, oriented x3.   Psychiatric: Mood euthymic. Affect congruent with full range and intensity.      CC:  Rachel Arora PA-C  May 22, 2025     [facility] [place]  [address] [place], [place] [id number] [place] [address] [facility] [place]  [address] [address]    Chart documentation was completed, in part, with [employer] voice-recognition software. Even though reviewed, some grammatical, spelling, and word errors may remain.    34 minutes spent on day of encounter doing chart review, history and exam, documentation, and further activities as noted above    "

## 2025-05-22 NOTE — NURSING NOTE
"Chief Complaint   Patient presents with    Study Results       Initial /59   Pulse 87   Ht 1.854 m (6' 1\")   Wt 79.8 kg (176 lb)   SpO2 93%   BMI 23.22 kg/m   Estimated body mass index is 23.22 kg/m  as calculated from the following:    Height as of this encounter: 1.854 m (6' 1\").    Weight as of this encounter: 79.8 kg (176 lb).    Medication Reconciliation: complete  ESS 13  Meka Luu MA   "

## 2025-06-03 ENCOUNTER — TRANSFERRED RECORDS (OUTPATIENT)
Dept: HEALTH INFORMATION MANAGEMENT | Facility: CLINIC | Age: 80
End: 2025-06-03
Payer: MEDICARE

## 2025-06-10 ENCOUNTER — TRANSCRIBE ORDERS (OUTPATIENT)
Dept: OTHER | Age: 80
End: 2025-06-10

## 2025-06-10 ENCOUNTER — MEDICAL CORRESPONDENCE (OUTPATIENT)
Dept: HEALTH INFORMATION MANAGEMENT | Facility: CLINIC | Age: 80
End: 2025-06-10
Payer: MEDICARE

## 2025-06-10 DIAGNOSIS — G62.89 OTHER SPECIFIED POLYNEUROPATHIES: Primary | ICD-10-CM

## 2025-06-16 NOTE — CONFIDENTIAL NOTE
NEUROLOGY - PRE VISIT PLANNING             Referring Provider Reason for Referral Office Visit Notes   Klaus Basurto, JACKY   [facility]  Other specified polyneuropathies  Scanned into MEDIA 6/3/2025        IMAGING/NOTES/SCANS Status/ Location  DATE   MRI/MRA(HEAD, NECK, SPINE) External - [facility] - Care Everywhere Authorization Needed  10/11/2022, 9/08/2021    CT/CTA   N/A     EMG N/A    EEG N/A    LABS Internal, External    Neuropsychological testing  N/A    Additional Testing/Notes N/A      Does patient have C2C?  Year last updated Action     YES   [x]   2024   Please update at appointment if outdated more than 5 years       NO     []   N/A   Please complete C2C at appointment

## 2025-06-19 ENCOUNTER — OFFICE VISIT (OUTPATIENT)
Dept: NEUROLOGY | Facility: CLINIC | Age: 80
End: 2025-06-19
Attending: STUDENT IN AN ORGANIZED HEALTH CARE EDUCATION/TRAINING PROGRAM
Payer: MEDICARE

## 2025-06-19 ENCOUNTER — PRE VISIT (OUTPATIENT)
Dept: NEUROLOGY | Facility: CLINIC | Age: 80
End: 2025-06-19

## 2025-06-19 VITALS
SYSTOLIC BLOOD PRESSURE: 91 MMHG | WEIGHT: 178.1 LBS | DIASTOLIC BLOOD PRESSURE: 52 MMHG | HEART RATE: 83 BPM | OXYGEN SATURATION: 96 % | BODY MASS INDEX: 23.5 KG/M2

## 2025-06-19 DIAGNOSIS — G62.9 POLYNEUROPATHY: Primary | ICD-10-CM

## 2025-06-19 DIAGNOSIS — R79.89 OTHER SPECIFIED ABNORMAL FINDINGS OF BLOOD CHEMISTRY: ICD-10-CM

## 2025-06-19 NOTE — PROGRESS NOTES
Neurology Consultation    Patient Name:  Brian English  MRN:  [id number]    :  1945  Date of Service:  2025  Primary care provider:  Rachel Arora        Chief Complaint: Neuropathy    History of Present Illness:     Brian English is a 79 year old male who was referred by his podiatrist for neuropathy evaluation.     15-20 years ago he was diagnosed with neuropathy at [place], which is around the time of symptoms onset.  He was having numbness and tingling in his feet. He thinks his symptoms have largely stayed about the same.  Numbness is from the toes to lower leg/ankle area, largely symmetrical.  He has not injured himself without knowing it but has to be careful.  Denies burning, sharp shooting, or electrical pain in his feet.  He has pain in his feet, largely at the bottom of his feet.  He has difficulty describing it.  He experiences pain more with walking.  He has been walking the dog more due to his wife's health and appreciated more pain in his feet.  Being off his feet helps with the pain.  Rarely has cramps in his feet.  Denies allodynia. He thinks his gait is changing and feels a little more clumsy.  Was using a cane for awhile but has since discontinued use.  No falls recently, has had 2 falls in the last year, however.  He slipped in mud one time and then another time maybe slipped on ice. Denies low back pain. No weakness. No symptoms in his hands. Does have Dupuytren's contractures of the 5th digit bilaterally.      Has a lot of problems with corns/calluses of his feet that has been a major problem recently.       Denies history of diabetes.  Denies toxic exposures to chemotherapy or radiation.  Denies neuromuscular problems in the family.      He is not sure what testing they did at [place].     Past Medical History:  - Alzheimer's dementia following with Dr. Montoya in memory clinic, adjustment disorder, REM behavioral disorder, BPH, allergic rhinitis    Social  History:  Denies tobacco use, alcohol consumption, or recreational drug use. 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     Physical Exam:  BP 91/52   Pulse 83   Wt 80.8 kg (178 lb 1.6 oz)   SpO2 96%   BMI 23.50 kg/m      General:  No acute distress  Cardiovascular: Normal rate.  Lung: Respirations are non-labored.    Neurologic:  Mental status : alert, fund of knowledge appropriate for visit    LANGUAGE: Speech fluent, no dysarthria     CN:  II- pupils equal and reactive, visual fields full  III, IV, VI- extraocular movements intact  V- sensation intact bilaterally  VII- face symmetric  VIII- hearing intact, no nystagmus  IX, X- palate elevates symmetrically  XI- sternocleidomastoid 5/5  XII- tongue midline    MOTOR : intact bulk and tone  5/5 strength in all ext (has bilateral 5th digit Dupuytren's contractures)    SENSORY : intact to temperature, pinprick, and vibration in BUE.  Decrease to temperature and pp to about 1/3 up the calf bilaterally. Vib reduced to absent to the R medial malleolus, reduced to absent to the L knee. Romberg +    REFLEXES:       Right Left   Triceps 2 2   Biceps 2 2   Brachioradialis 2 2   Knee jerk 1+ 1+   Ankle jerk 0 0   Lozano absent   Toes down going     MOVEMENT/COORDINATION: finger to nose, finger taps, and heel to shin intact bilaterally     GAIT : Wider based at times, somewhat unsteady.  Unable to walk in tandem     Cognition and Speech: Speech clear and coherent.    Psychiatric: Cooperative, Appropriate mood & affect.      Assessment/Plan:   Brian English is a 79 year old male who was referred by his podiatrist for neuropathy evaluation.  Patient's presentation is consistent with a length dependent polyneuropathy that has largely remained stable since onset over 15-20 years ago.  Will check treatable causes for neuropathy although suspect idiopathic in nature.  He does have prominent hammertoes which could point towards a hereditary neuropathy as well.  EMG unlikely  to  and do not think it's needed at this time. He does describe pain in his feet but does not sound convincing for neuropathic pain from neuropathy.  I also do not think we can ascribe the calluses to the neuropathy either.  I discussed neuropathic pain agents but hesitant due to possible cognitive side effects and low suspicion that his pain is neuropathic and will respond. He can continue to follow with podiatry. He agreed and did not want to start anything. Counseled on fall precautions.  I will see him again in a year unless new concerns arise.     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

## 2025-06-19 NOTE — LETTER
2025      Brian English  [address]  [facility] [address]      Dear Colleague,    Thank you for referring your patient, Brian English, to the [facility]. Please see a copy of my visit note below.      Neurology Consultation    Patient Name:  Brian English  MRN:  [id number]    :  1945  Date of Service:  2025  Primary care provider:  Rachel Arora        Chief Complaint: Neuropathy    History of Present Illness:     Brian English is a 79 year old male who was referred by his podiatrist for neuropathy evaluation.     15-20 years ago he was diagnosed with neuropathy at [place], which is around the time of symptoms onset.  He was having numbness and tingling in his feet. He thinks his symptoms have largely stayed about the same.  Numbness is from the toes to lower leg/ankle area, largely symmetrical.  He has not injured himself without knowing it but has to be careful.  Denies burning, sharp shooting, or electrical pain in his feet.  He has pain in his feet, largely at the bottom of his feet.  He has difficulty describing it.  He experiences pain more with walking.  He has been walking the dog more due to his wife's health and appreciated more pain in his feet.  Being off his feet helps with the pain.  Rarely has cramps in his feet.  Denies allodynia. He thinks his gait is changing and feels a little more clumsy.  Was using a cane for awhile but has since discontinued use.  No falls recently, has had 2 falls in the last year, however.  He slipped in mud one time and then another time maybe slipped on ice. Denies low back pain. No weakness. No symptoms in his hands. Does have Dupuytren's contractures of the 5th digit bilaterally.      Has a lot of problems with corns/calluses of his feet that has been a major problem recently.       Denies history of diabetes.  Denies toxic exposures to chemotherapy or radiation.  Denies  neuromuscular problems in the family.      He is not sure what testing they did at [facility].     Past Medical History:  - Alzheimer's dementia following with Dr. Montoya in memory clinic, adjustment disorder, REM behavioral disorder, BPH, allergic rhinitis    Social History:  Denies tobacco use, alcohol consumption, or recreational drug use. 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     Physical Exam:  BP 91/52   Pulse 83   Wt 80.8 kg (178 lb 1.6 oz)   SpO2 96%   BMI 23.50 kg/m      General:  No acute distress  Cardiovascular: Normal rate.  Lung: Respirations are non-labored.    Neurologic:  Mental status : alert, fund of knowledge appropriate for visit    LANGUAGE: Speech fluent, no dysarthria     CN:  II- pupils equal and reactive, visual fields full  III, IV, VI- extraocular movements intact  V- sensation intact bilaterally  VII- face symmetric  VIII- hearing intact, no nystagmus  IX, X- palate elevates symmetrically  XI- sternocleidomastoid 5/5  XII- tongue midline    MOTOR : intact bulk and tone  5/5 strength in all ext (has bilateral 5th digit Dupuytren's contractures)    SENSORY : intact to temperature, pinprick, and vibration in BUE.  Decrease to temperature and pp to about 1/3 up the calf bilaterally. Vib reduced to absent to the R medial malleolus, reduced to absent to the L knee. Romberg +    REFLEXES:       Right Left   Triceps 2 2   Biceps 2 2   Brachioradialis 2 2   Knee jerk 1+ 1+   Ankle jerk 0 0   Lozano absent   Toes down going     MOVEMENT/COORDINATION: finger to nose, finger taps, and heel to shin intact bilaterally     GAIT : Wider based at times, somewhat unsteady.  Unable to walk in tandem     Cognition and Speech: Speech clear and coherent.    Psychiatric: Cooperative, Appropriate mood & affect.      Assessment/Plan:   Brian English is a 79 year old male who was referred by his podiatrist for neuropathy evaluation.  Patient's presentation is consistent with a length dependent polyneuropathy  that has largely remained stable since onset over 15-20 years ago.  Will check treatable causes for neuropathy although suspect idiopathic in nature.  He does have prominent hammertoes which could point towards a hereditary neuropathy as well.  EMG unlikely to  and do not think it's needed at this time. He does describe pain in his feet but does not sound convincing for neuropathic pain from neuropathy.  I also do not think we can ascribe the calluses to the neuropathy either.  I discussed neuropathic pain agents but hesitant due to possible cognitive side effects and low suspicion that his pain is neuropathic and will respond. He can continue to follow with podiatry. He agreed and did not want to start anything. Counseled on fall precautions.  I will see him again in a year unless new concerns arise.     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      Again, thank you for allowing me to participate in the care of your patient.        Sincerely,        Suzanne Whitney, DO    Electronically signed

## 2025-06-27 ENCOUNTER — LAB (OUTPATIENT)
Dept: LAB | Facility: CLINIC | Age: 80
End: 2025-06-27
Payer: MEDICARE

## 2025-06-27 DIAGNOSIS — G62.9 POLYNEUROPATHY: ICD-10-CM

## 2025-06-27 DIAGNOSIS — R79.89 OTHER SPECIFIED ABNORMAL FINDINGS OF BLOOD CHEMISTRY: ICD-10-CM

## 2025-06-27 LAB
EST. AVERAGE GLUCOSE BLD GHB EST-MCNC: 108 MG/DL
HBA1C MFR BLD: 5.4 % (ref 0–5.6)
TOTAL PROTEIN SERUM FOR ELP: 6.7 G/DL (ref 6.4–8.3)
VIT B12 SERPL-MCNC: 555 PG/ML (ref 232–1245)

## 2025-06-27 PROCEDURE — 86334 IMMUNOFIX E-PHORESIS SERUM: CPT | Performed by: PATHOLOGY

## 2025-06-27 PROCEDURE — 83036 HEMOGLOBIN GLYCOSYLATED A1C: CPT | Performed by: PSYCHIATRY & NEUROLOGY

## 2025-06-27 PROCEDURE — 82607 VITAMIN B-12: CPT | Performed by: PSYCHIATRY & NEUROLOGY

## 2025-06-27 PROCEDURE — 83921 ORGANIC ACID SINGLE QUANT: CPT | Performed by: PSYCHIATRY & NEUROLOGY

## 2025-06-27 PROCEDURE — 36415 COLL VENOUS BLD VENIPUNCTURE: CPT | Performed by: PSYCHIATRY & NEUROLOGY

## 2025-06-27 PROCEDURE — 84425 ASSAY OF VITAMIN B-1: CPT | Mod: 90 | Performed by: PSYCHIATRY & NEUROLOGY

## 2025-06-27 PROCEDURE — 3044F HG A1C LEVEL LT 7.0%: CPT | Performed by: PSYCHIATRY & NEUROLOGY

## 2025-06-27 PROCEDURE — 99000 SPECIMEN HANDLING OFFICE-LAB: CPT | Performed by: PSYCHIATRY & NEUROLOGY

## 2025-06-27 PROCEDURE — 84155 ASSAY OF PROTEIN SERUM: CPT | Performed by: PSYCHIATRY & NEUROLOGY

## 2025-06-27 PROCEDURE — 84165 PROTEIN E-PHORESIS SERUM: CPT | Performed by: PATHOLOGY

## 2025-06-30 LAB
ALBUMIN SERPL ELPH-MCNC: 4.1 G/DL (ref 3.7–5.1)
ALPHA1 GLOB SERPL ELPH-MCNC: 0.3 G/DL (ref 0.2–0.4)
ALPHA2 GLOB SERPL ELPH-MCNC: 0.6 G/DL (ref 0.5–0.9)
B-GLOBULIN SERPL ELPH-MCNC: 0.7 G/DL (ref 0.6–1)
GAMMA GLOB SERPL ELPH-MCNC: 1 G/DL (ref 0.7–1.6)
M PROTEIN SERPL ELPH-MCNC: 0 G/DL
PROT PATTERN SERPL ELPH-IMP: NORMAL
PROT PATTERN SERPL IFE-IMP: NORMAL

## 2025-07-02 LAB
METHYLMALONATE SERPL-SCNC: 0.16 UMOL/L (ref 0–0.4)
VIT B1 PYROPHOSHATE BLD-SCNC: 160 NMOL/L

## 2025-07-13 DIAGNOSIS — G31.84 AMNESTIC MCI (MILD COGNITIVE IMPAIRMENT WITH MEMORY LOSS): ICD-10-CM

## 2025-07-14 RX ORDER — ESCITALOPRAM OXALATE 5 MG/1
5 TABLET ORAL DAILY
Qty: 30 TABLET | Refills: 2 | Status: SHIPPED | OUTPATIENT
Start: 2025-07-14

## 2025-07-20 ASSESSMENT — SLEEP AND FATIGUE QUESTIONNAIRES
HOW LIKELY ARE YOU TO NOD OFF OR FALL ASLEEP WHEN YOU ARE A PASSENGER IN A CAR FOR AN HOUR WITHOUT A BREAK: SLIGHT CHANCE OF DOZING
HOW LIKELY ARE YOU TO NOD OFF OR FALL ASLEEP WHILE WATCHING TV: HIGH CHANCE OF DOZING
HOW LIKELY ARE YOU TO NOD OFF OR FALL ASLEEP IN A CAR, WHILE STOPPED FOR A FEW MINUTES IN TRAFFIC: WOULD NEVER DOZE
HOW LIKELY ARE YOU TO NOD OFF OR FALL ASLEEP WHILE SITTING AND TALKING TO SOMEONE: WOULD NEVER DOZE
HOW LIKELY ARE YOU TO NOD OFF OR FALL ASLEEP WHILE SITTING INACTIVE IN A PUBLIC PLACE: SLIGHT CHANCE OF DOZING
HOW LIKELY ARE YOU TO NOD OFF OR FALL ASLEEP WHILE SITTING QUIETLY AFTER LUNCH WITHOUT ALCOHOL: HIGH CHANCE OF DOZING
HOW LIKELY ARE YOU TO NOD OFF OR FALL ASLEEP WHILE SITTING AND READING: HIGH CHANCE OF DOZING
HOW LIKELY ARE YOU TO NOD OFF OR FALL ASLEEP WHILE LYING DOWN TO REST IN THE AFTERNOON WHEN CIRCUMSTANCES PERMIT: HIGH CHANCE OF DOZING

## 2025-07-21 ENCOUNTER — OFFICE VISIT (OUTPATIENT)
Dept: SLEEP MEDICINE | Facility: CLINIC | Age: 80
End: 2025-07-21
Payer: MEDICARE

## 2025-07-21 VITALS
HEART RATE: 74 BPM | OXYGEN SATURATION: 96 % | WEIGHT: 175 LBS | DIASTOLIC BLOOD PRESSURE: 71 MMHG | HEIGHT: 73 IN | BODY MASS INDEX: 23.19 KG/M2 | SYSTOLIC BLOOD PRESSURE: 104 MMHG

## 2025-07-21 DIAGNOSIS — G47.52 RBD (REM BEHAVIORAL DISORDER): Primary | ICD-10-CM

## 2025-07-21 PROCEDURE — 1125F AMNT PAIN NOTED PAIN PRSNT: CPT | Performed by: PSYCHIATRY & NEUROLOGY

## 2025-07-21 PROCEDURE — 3078F DIAST BP <80 MM HG: CPT | Performed by: PSYCHIATRY & NEUROLOGY

## 2025-07-21 PROCEDURE — 3074F SYST BP LT 130 MM HG: CPT | Performed by: PSYCHIATRY & NEUROLOGY

## 2025-07-21 PROCEDURE — 99215 OFFICE O/P EST HI 40 MIN: CPT | Performed by: PSYCHIATRY & NEUROLOGY

## 2025-07-21 PROCEDURE — 99417 PROLNG OP E/M EACH 15 MIN: CPT | Performed by: PSYCHIATRY & NEUROLOGY

## 2025-07-21 NOTE — PROGRESS NOTES
Brief Sleep Note    RBD Initial Evaluation    HPI:  Brian English is an 79 year old male with Alzheimers dementia, adjustment disorder, BPH, allergic rhinitis who presents to clinic with his wife, Camille, following PSG on 3/18/25 which confirmed diagnosis of RBD.    Within the past year, Luiz has noticed that Brian has started acting out dreams. This is typically characterized by incomprehensible vocalizations occurring once a month on average and flailing of the extremities, which occur even more rarely. These actions tend to occur between 2-4 AM. He has fallen out of bed once, in June of 2025. Brian does not remember his dreams, and certainly does not recall the vocalizations or movements that Camille is describing.     Brian was started on mirtazepine in the Fall of 2024, after abnormal sleep behaviors started. He was initially seen in sleep clinic in December 2024, primarily for insomnia. At this visit, loud snoring and dream enactment behavior was described, prompting recommendation for PSG. In the interim, mirtazapine was switched to Lexapro in January 2025. Camille did not notice any changes to sleep behaviors with this switch. The patient has had a PSG demonstrating increased REM motor activity dated 3/18/25. Afterwards, was seen was in sleep clinic and started on melatonin 10 mg at bedtime with plan to see Dr. Hogan for ongoing management.     Since starting melatonin, both Brian and Camille deny any significant changes to his sleep and sleep behaviors. He typically falls asleep between 11 PM and 12 AM. He will wake up between 4 AM and 5 AM. Will initially try to go back to sleep, though if he is unable to, will simply start his day rather than wait in bed. May then take a morning nap around 8 AM. He will periodically fall asleep while watching television in the afternoon or evening in addition to the morning nap. Overall, both patient and wife are content with his current sleeping  schedule.    Brian and Camille otherwise report slowing of his gait, but deny recent falls. He does not have any tremors. His sense of smell has been worse than Camille's for many years without any noticeable changes recently. No constipation, orthostasis, or hallucinations. He noticed loss of short term memory 2-3 years ago and was diagnosed with Alzheimer's Disease after amyloid PET scan, follows in memory clinic with Dr. Montoya. Currently on donepezil 10 mg.      There is no known family history of Parkinson's Disease, Alzheimer's Disease, or RBD, though does not know father.    On exam the patient demonstrates subtle bradykinesia with rapid alternating of the left hand and left foot compared to the right hemibody, he has mild rigidity on the left hemibody compared to the right, clearly exacerbated with distracting maneuvers, though there is no resting tremor. Gait is notable for mildly reduced arm swing on the left compared to right, but otherwise good base, stride length, and nimble turn. Pull test is negative.     Assessment/Plan  79 year old male with PSG confirmed REM sleep Behavior Disorder, though was completed while taking Lexapro. Examination with only subtle signs of parkinsonism and without hallucinations and without fluctuations. 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 It possible, however, that his underlying RBD was exacerbated by serotonergic agent leading to fall out of bed.     Overall, dream enactment has largely not been a safety concern for himself or his bed partner. As neither patient or his wife have noticed significant changes, will opt to discontinue melatonin. Would restart should he develop more dangerous behaviors, including punching, kicking, standing up, or kneeling on bed. Would additionally re-consider risk/benefits of continuing Lexapro if these behaviors were to  develop.    We discussed the critical important of removing weapons from the bedroom as well as steps that can be taken to promote a safe sleep environment.  Discussed the benefits of sleeping seperately from their bed partner.  Patient indicates understanding.      All questions were answered. He is welcome to return to clinic at any point in future should new concerns arise.  Asked to please mychart with any issues that may arise.     It is a great privilege being asked to participate in this patients care.  The patient has been advised on the importance in of never operating operating a motor vehicle while tired or sleepy (will defer to other providers his cognitive capacity for driving).        Patient was seen and discussed with Sleep Medicine staff, Dr. Hogan.    Karri Donovan MD  Neurology Resident PGY3    Outpatient Sleep Medicine Staff  I have seen and evaluated the patient and discussed with Dr Donovan on 7-21-25.  I supervised the visit and agree with the documentation.      In addition to face to face time I have also reviewed the patients chart, coordinated care, assisted in placing orders and documentation.  Total time (Face-to-Face, care coordination, orders, documentation) spent on 7-21-25 was greater than 60 minutes.     Berhane Hogan MD

## 2025-07-27 ENCOUNTER — MYC MEDICAL ADVICE (OUTPATIENT)
Dept: NEUROLOGY | Facility: CLINIC | Age: 80
End: 2025-07-27

## 2025-07-31 ENCOUNTER — OFFICE VISIT (OUTPATIENT)
Dept: FAMILY MEDICINE | Facility: CLINIC | Age: 80
End: 2025-07-31
Payer: MEDICARE

## 2025-07-31 ENCOUNTER — ANCILLARY PROCEDURE (OUTPATIENT)
Dept: ULTRASOUND IMAGING | Facility: CLINIC | Age: 80
End: 2025-07-31
Attending: INTERNAL MEDICINE
Payer: MEDICARE

## 2025-07-31 VITALS
BODY MASS INDEX: 23.33 KG/M2 | DIASTOLIC BLOOD PRESSURE: 61 MMHG | TEMPERATURE: 97.7 F | WEIGHT: 176 LBS | HEART RATE: 77 BPM | OXYGEN SATURATION: 97 % | RESPIRATION RATE: 16 BRPM | HEIGHT: 73 IN | SYSTOLIC BLOOD PRESSURE: 109 MMHG

## 2025-07-31 DIAGNOSIS — R19.09 GROIN LUMP: ICD-10-CM

## 2025-07-31 DIAGNOSIS — R35.1 NOCTURIA: ICD-10-CM

## 2025-07-31 DIAGNOSIS — G30.1 MILD LATE ONSET ALZHEIMER'S DEMENTIA WITHOUT BEHAVIORAL DISTURBANCE, PSYCHOTIC DISTURBANCE, MOOD DISTURBANCE, OR ANXIETY (H): ICD-10-CM

## 2025-07-31 DIAGNOSIS — E78.5 HYPERLIPIDEMIA LDL GOAL <100: ICD-10-CM

## 2025-07-31 DIAGNOSIS — F02.A0 MILD LATE ONSET ALZHEIMER'S DEMENTIA WITHOUT BEHAVIORAL DISTURBANCE, PSYCHOTIC DISTURBANCE, MOOD DISTURBANCE, OR ANXIETY (H): ICD-10-CM

## 2025-07-31 DIAGNOSIS — F02.A0 DEMENTIA IN OTHER DISEASES CLASSIFIED ELSEWHERE, MILD, WITHOUT BEHAVIORAL DISTURBANCE, PSYCHOTIC DISTURBANCE, MOOD DISTURBANCE, AND ANXIETY (H): ICD-10-CM

## 2025-07-31 DIAGNOSIS — G30.9 ALZHEIMER'S DISEASE (H): ICD-10-CM

## 2025-07-31 DIAGNOSIS — N50.89 LUMP IN SCROTUM: Primary | ICD-10-CM

## 2025-07-31 DIAGNOSIS — N50.89 LUMP IN SCROTUM: ICD-10-CM

## 2025-07-31 DIAGNOSIS — F02.80 DEMENTIA IN OTHER DISEASES CLASSIFIED ELSEWHERE, UNSPECIFIED SEVERITY, WITHOUT BEHAVIORAL DISTURBANCE, PSYCHOTIC DISTURBANCE, MOOD DISTURBANCE, AND ANXIETY (H): ICD-10-CM

## 2025-07-31 DIAGNOSIS — D17.30 LIPOMA OF SKIN AND SUBCUTANEOUS TISSUE: Primary | ICD-10-CM

## 2025-07-31 DIAGNOSIS — F02.80 ALZHEIMER'S DISEASE (H): ICD-10-CM

## 2025-07-31 PROCEDURE — 3074F SYST BP LT 130 MM HG: CPT | Performed by: INTERNAL MEDICINE

## 2025-07-31 PROCEDURE — 99214 OFFICE O/P EST MOD 30 MIN: CPT | Performed by: INTERNAL MEDICINE

## 2025-07-31 PROCEDURE — 93976 VASCULAR STUDY: CPT

## 2025-07-31 PROCEDURE — G2211 COMPLEX E/M VISIT ADD ON: HCPCS | Performed by: INTERNAL MEDICINE

## 2025-07-31 PROCEDURE — 1126F AMNT PAIN NOTED NONE PRSNT: CPT | Performed by: INTERNAL MEDICINE

## 2025-07-31 PROCEDURE — 3078F DIAST BP <80 MM HG: CPT | Performed by: INTERNAL MEDICINE

## 2025-07-31 ASSESSMENT — PAIN SCALES - GENERAL: PAINLEVEL_OUTOF10: NO PAIN (0)

## 2025-07-31 NOTE — PROGRESS NOTES
{PROVIDER CHARTING PREFERENCE:731310}    Efren Torres is a 79 year old, presenting for the following health issues:  No chief complaint on file.  {(!) Visit Details have not yet been documented.  Please enter Visit Details and then use this list to pull in documentation. (Optional):037650}  History of Present Illness       Reason for visit:  Egg-sized lump near my testicles  Symptoms include:  Large lump  Symptom intensity:  Mild  Symptom progression:  Staying the same  Had these symptoms before:  No  What makes it worse:  N/a  What makes it better:  N/a   He is taking medications regularly.        {MA/LPN/RN Pre-Provider Visit Orders- hCG/UA/Strep (Optional):911629}  {SUPERLIST (Optional):862261}  {additonal problems for provider to add (Optional):977076}    {ROS Picklists (Optional):480163}      Objective    There were no vitals taken for this visit.  There is no height or weight on file to calculate BMI.  Physical Exam   {Exam List (Optional):219193}    {Diagnostic Test Results (Optional):614275}        Signed Electronically by: Rachel Arora MD  {Email feedback regarding this note to [email or web address]   :[id number]}   file    Years of education: Not on file    Highest education level: Not on file   Occupational History    Not on file   Tobacco Use    Smoking status: Former    Smokeless tobacco: Not on file   Vaping Use    Vaping status: Never Used   Substance and Sexual Activity    Alcohol use: Yes     Alcohol/week: 5.8 standard drinks of alcohol     Types: 7 Glasses of wine per week    Drug use: Never    Sexual activity: Yes     Partners: Female   Other Topics Concern    Not on file   Social History Narrative    Not on file     Social Drivers of Health     Financial Resource Strain: Low Risk  (8/1/2024)    Financial Resource Strain     Within the past 12 months, have you or your family members you live with been unable to get utilities (heat, electricity) when it was really needed?: No   Food Insecurity: Low Risk  (8/1/2024)    Food Insecurity     Within the past 12 months, did you worry that your food would run out before you got money to buy more?: No     Within the past 12 months, did the food you bought just not last and you didn t have money to get more?: No   Transportation Needs: Low Risk  (8/1/2024)    Transportation Needs     Within the past 12 months, has lack of transportation kept you from medical appointments, getting your medicines, non-medical meetings or appointments, work, or from getting things that you need?: No   Physical Activity: Insufficiently Active (1/19/2024)    Received from [facility]    Exercise Vital Sign     Days of Exercise per Week: 2 days     Minutes of Exercise per Session: 40 min   Stress: Stress Concern Present (5/27/2022)    Received from [facility]    [employer] - Occupational Stress Questionnaire     Feeling of Stress : To some extent   Social Connections: Socially Integrated (5/27/2022)    Received from [facility]    Social Connection and Isolation Panel [NHANES]     Frequency of Communication with Friends and Family: More than three times a week      "Frequency of Social Gatherings with Friends and Family: Twice a week     Attends Sikhism Services: More than 4 times per year     Active Member of Clubs or Organizations: Yes     Attends Club or Organization Meetings: More than 4 times per year     Marital Status:    Interpersonal Safety: Low Risk  (11/21/2024)    Interpersonal Safety     Do you feel physically and emotionally safe where you currently live?: Yes     Within the past 12 months, have you been hit, slapped, kicked or otherwise physically hurt by someone?: No     Within the past 12 months, have you been humiliated or emotionally abused in other ways by your partner or ex-partner?: No   Housing Stability: High Risk (8/1/2024)    Housing Stability     Do you have housing? : No     Are you worried about losing your housing?: No           Review of Systems  Constitutional, HEENT, cardiovascular, pulmonary, GI, , musculoskeletal, neuro, skin, endocrine and psych systems are negative, except as otherwise noted.      Objective    /61 (BP Location: Right arm, Patient Position: Sitting, Cuff Size: Adult Regular)   Pulse 77   Temp 97.7  F (36.5  C) (Temporal)   Resp 16   Ht 1.854 m (6' 1\")   Wt 79.8 kg (176 lb)   SpO2 97%   BMI 23.22 kg/m    Body mass index is 23.22 kg/m .  Physical Exam   GENERAL: alert and no distress  EYES: Eyes grossly normal to inspection, conjunctivae and sclerae normal  HENT: normocephalic atraumatic  NECK: no asymmetry  RESP: lungs clear to auscultation - no rales, rhonchi or wheezes  CV: regular rate and rhythm, normal S1 S2  ABDOMEN: soft, nontender, bowel sounds normal  MS: right groin lump noted, no edema  SKIN: no suspicious lesions or rashes  NEURO: memory loss present  PSYCH: flat mentation    Labs reviewed in Epic      Results for orders placed or performed in visit on 07/31/25   US Testicular & Scrotum w Doppler Ltd     Status: None    Narrative    EXAM: US TESTICULAR AND SCROTUM WITH DOPPLER " LIMITED  LOCATION: [facility]  DATE: 7/31/2025    INDICATION:  Lump in scrotum  COMPARISON: None.  TECHNIQUE: Ultrasound of scrotum with color flow and spectral Doppler with waveform analysis performed.    FINDINGS:    RIGHT: Right testicle measures 5.0 x 2.8 x 2.5 cm. Normal testicle with no masses. Tubular ectasia of the rete testis. Normal arterial duplex and normal color flow. Normal epididymis. No hydrocele. Small varicocele.    LEFT: Left testicle measures 4.4 x 2.5 x 2.2 cm. Normal testicle with no masses. Normal arterial duplex and normal color flow. Normal epididymis. No hydrocele. Small varicocele.    Targeted evaluation of the palpable lump in the right groin outside the scrotum demonstrates a subcutaneous lipoma measuring 5.6 x 2.3 x 1.3 cm.      Impression    IMPRESSION:  1.  The palpable lump in the right groin is outside the scrotum and consistent with a lipoma.    2.  Bilateral varicoceles.       The longitudinal plan of care for the diagnosis(es)/condition(s) as documented were addressed during this visit. Due to the added complexity in care, I will continue to support Brian in the subsequent management and with ongoing continuity of care.    Signed Electronically by: Rachel Arora MD
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
## 2025-08-12 DIAGNOSIS — E78.5 HYPERLIPIDEMIA LDL GOAL <100: ICD-10-CM

## 2025-08-12 RX ORDER — SIMVASTATIN 20 MG
20 TABLET ORAL AT BEDTIME
Qty: 90 TABLET | Refills: 2 | Status: SHIPPED | OUTPATIENT
Start: 2025-08-12

## 2025-08-31 DIAGNOSIS — R35.1 NOCTURIA: Primary | ICD-10-CM

## 2025-09-02 RX ORDER — TAMSULOSIN HYDROCHLORIDE 0.4 MG/1
0.4 CAPSULE ORAL EVERY EVENING
Qty: 90 CAPSULE | Refills: 3 | Status: SHIPPED | OUTPATIENT
Start: 2025-09-02